# Patient Record
Sex: FEMALE | Race: WHITE | NOT HISPANIC OR LATINO
[De-identification: names, ages, dates, MRNs, and addresses within clinical notes are randomized per-mention and may not be internally consistent; named-entity substitution may affect disease eponyms.]

---

## 2017-02-10 ENCOUNTER — MEDICATION RENEWAL (OUTPATIENT)
Age: 67
End: 2017-02-10

## 2017-03-30 ENCOUNTER — APPOINTMENT (OUTPATIENT)
Dept: NEUROLOGY | Facility: CLINIC | Age: 67
End: 2017-03-30

## 2017-06-06 ENCOUNTER — APPOINTMENT (OUTPATIENT)
Dept: DERMATOLOGY | Facility: CLINIC | Age: 67
End: 2017-06-06

## 2017-08-25 ENCOUNTER — MEDICATION RENEWAL (OUTPATIENT)
Age: 67
End: 2017-08-25

## 2017-11-21 ENCOUNTER — RX RENEWAL (OUTPATIENT)
Age: 67
End: 2017-11-21

## 2017-12-14 ENCOUNTER — APPOINTMENT (OUTPATIENT)
Dept: MRI IMAGING | Facility: HOSPITAL | Age: 67
End: 2017-12-14

## 2017-12-19 ENCOUNTER — FORM ENCOUNTER (OUTPATIENT)
Age: 67
End: 2017-12-19

## 2017-12-20 ENCOUNTER — APPOINTMENT (OUTPATIENT)
Dept: MRI IMAGING | Facility: HOSPITAL | Age: 67
End: 2017-12-20

## 2017-12-20 ENCOUNTER — OUTPATIENT (OUTPATIENT)
Dept: OUTPATIENT SERVICES | Facility: HOSPITAL | Age: 67
LOS: 1 days | End: 2017-12-20
Payer: SUBSIDIZED

## 2017-12-20 DIAGNOSIS — Z00.6 ENCOUNTER FOR EXAMINATION FOR NORMAL COMPARISON AND CONTROL IN CLINICAL RESEARCH PROGRAM: ICD-10-CM

## 2017-12-20 PROCEDURE — 70551 MRI BRAIN STEM W/O DYE: CPT

## 2017-12-20 PROCEDURE — 70551 MRI BRAIN STEM W/O DYE: CPT | Mod: 26

## 2017-12-20 PROCEDURE — 78608 BRAIN IMAGING (PET): CPT

## 2018-01-02 ENCOUNTER — OTHER (OUTPATIENT)
Age: 68
End: 2018-01-02

## 2018-01-02 DIAGNOSIS — R29.6 REPEATED FALLS: ICD-10-CM

## 2018-03-19 ENCOUNTER — RX RENEWAL (OUTPATIENT)
Age: 68
End: 2018-03-19

## 2018-04-20 ENCOUNTER — APPOINTMENT (OUTPATIENT)
Dept: OPHTHALMOLOGY | Facility: CLINIC | Age: 68
End: 2018-04-20
Payer: MEDICARE

## 2018-04-20 DIAGNOSIS — Z83.518 FAMILY HISTORY OF OTHER SPECIFIED EYE DISORDER: ICD-10-CM

## 2018-04-20 PROCEDURE — 92083 EXTENDED VISUAL FIELD XM: CPT

## 2018-04-20 PROCEDURE — 99204 OFFICE O/P NEW MOD 45 MIN: CPT

## 2018-04-23 ENCOUNTER — FORM ENCOUNTER (OUTPATIENT)
Age: 68
End: 2018-04-23

## 2018-04-24 ENCOUNTER — APPOINTMENT (OUTPATIENT)
Dept: MRI IMAGING | Facility: HOSPITAL | Age: 68
End: 2018-04-24

## 2018-04-24 ENCOUNTER — OUTPATIENT (OUTPATIENT)
Dept: OUTPATIENT SERVICES | Facility: HOSPITAL | Age: 68
LOS: 1 days | End: 2018-04-24
Payer: SUBSIDIZED

## 2018-04-24 DIAGNOSIS — Z00.6 ENCOUNTER FOR EXAMINATION FOR NORMAL COMPARISON AND CONTROL IN CLINICAL RESEARCH PROGRAM: ICD-10-CM

## 2018-04-24 DIAGNOSIS — Z00.00 ENCOUNTER FOR GENERAL ADULT MEDICAL EXAMINATION WITHOUT ABNORMAL FINDINGS: ICD-10-CM

## 2018-04-24 PROCEDURE — 70551 MRI BRAIN STEM W/O DYE: CPT | Mod: 26

## 2018-04-24 PROCEDURE — 70551 MRI BRAIN STEM W/O DYE: CPT

## 2018-05-07 ENCOUNTER — RX RENEWAL (OUTPATIENT)
Age: 68
End: 2018-05-07

## 2018-05-09 ENCOUNTER — APPOINTMENT (OUTPATIENT)
Dept: MRI IMAGING | Facility: HOSPITAL | Age: 68
End: 2018-05-09

## 2018-05-21 ENCOUNTER — APPOINTMENT (OUTPATIENT)
Dept: OPHTHALMOLOGY | Facility: CLINIC | Age: 68
End: 2018-05-21
Payer: MEDICARE

## 2018-05-21 PROCEDURE — ZZZZZ: CPT

## 2018-05-21 PROCEDURE — 92012 INTRM OPH EXAM EST PATIENT: CPT

## 2018-05-21 PROCEDURE — 92083 EXTENDED VISUAL FIELD XM: CPT

## 2018-06-05 ENCOUNTER — OTHER (OUTPATIENT)
Age: 68
End: 2018-06-05

## 2018-06-11 DIAGNOSIS — H53.461 HOMONYMOUS BILATERAL FIELD DEFECTS, RIGHT SIDE: ICD-10-CM

## 2018-06-12 ENCOUNTER — OTHER (OUTPATIENT)
Age: 68
End: 2018-06-12

## 2018-06-18 ENCOUNTER — FORM ENCOUNTER (OUTPATIENT)
Age: 68
End: 2018-06-18

## 2018-06-19 ENCOUNTER — OUTPATIENT (OUTPATIENT)
Dept: OUTPATIENT SERVICES | Facility: HOSPITAL | Age: 68
LOS: 1 days | End: 2018-06-19
Payer: MEDICARE

## 2018-06-19 ENCOUNTER — APPOINTMENT (OUTPATIENT)
Dept: MRI IMAGING | Facility: HOSPITAL | Age: 68
End: 2018-06-19

## 2018-06-19 DIAGNOSIS — H53.461 HOMONYMOUS BILATERAL FIELD DEFECTS, RIGHT SIDE: ICD-10-CM

## 2018-06-19 DIAGNOSIS — G31.84 MILD COGNITIVE IMPAIRMENT OF UNCERTAIN OR UNKNOWN ETIOLOGY: ICD-10-CM

## 2018-06-19 DIAGNOSIS — G93.9 DISORDER OF BRAIN, UNSPECIFIED: ICD-10-CM

## 2018-06-19 PROCEDURE — A9585: CPT

## 2018-06-19 PROCEDURE — 70553 MRI BRAIN STEM W/O & W/DYE: CPT | Mod: 26

## 2018-06-19 PROCEDURE — 70553 MRI BRAIN STEM W/O & W/DYE: CPT

## 2018-07-17 ENCOUNTER — FORM ENCOUNTER (OUTPATIENT)
Age: 68
End: 2018-07-17

## 2018-07-18 ENCOUNTER — OUTPATIENT (OUTPATIENT)
Dept: OUTPATIENT SERVICES | Facility: HOSPITAL | Age: 68
LOS: 1 days | End: 2018-07-18
Payer: SUBSIDIZED

## 2018-07-18 ENCOUNTER — APPOINTMENT (OUTPATIENT)
Dept: MRI IMAGING | Facility: HOSPITAL | Age: 68
End: 2018-07-18

## 2018-07-18 DIAGNOSIS — Z00.6 ENCOUNTER FOR EXAMINATION FOR NORMAL COMPARISON AND CONTROL IN CLINICAL RESEARCH PROGRAM: ICD-10-CM

## 2018-07-18 DIAGNOSIS — Z00.00 ENCOUNTER FOR GENERAL ADULT MEDICAL EXAMINATION WITHOUT ABNORMAL FINDINGS: ICD-10-CM

## 2018-07-18 PROCEDURE — 70551 MRI BRAIN STEM W/O DYE: CPT | Mod: 26

## 2018-07-18 PROCEDURE — 70551 MRI BRAIN STEM W/O DYE: CPT

## 2018-09-17 ENCOUNTER — FORM ENCOUNTER (OUTPATIENT)
Age: 68
End: 2018-09-17

## 2018-09-18 ENCOUNTER — APPOINTMENT (OUTPATIENT)
Dept: MRI IMAGING | Facility: HOSPITAL | Age: 68
End: 2018-09-18

## 2018-09-18 ENCOUNTER — OUTPATIENT (OUTPATIENT)
Dept: OUTPATIENT SERVICES | Facility: HOSPITAL | Age: 68
LOS: 1 days | End: 2018-09-18
Payer: SUBSIDIZED

## 2018-09-18 DIAGNOSIS — Z00.6 ENCOUNTER FOR EXAMINATION FOR NORMAL COMPARISON AND CONTROL IN CLINICAL RESEARCH PROGRAM: ICD-10-CM

## 2018-09-18 DIAGNOSIS — I67.1 CEREBRAL ANEURYSM, NONRUPTURED: ICD-10-CM

## 2018-09-18 PROCEDURE — 70551 MRI BRAIN STEM W/O DYE: CPT

## 2018-09-18 PROCEDURE — 70551 MRI BRAIN STEM W/O DYE: CPT | Mod: 26

## 2018-11-27 ENCOUNTER — APPOINTMENT (OUTPATIENT)
Dept: OPHTHALMOLOGY | Facility: CLINIC | Age: 68
End: 2018-11-27
Payer: MEDICARE

## 2018-11-27 PROCEDURE — 92012 INTRM OPH EXAM EST PATIENT: CPT

## 2018-11-27 PROCEDURE — 92083 EXTENDED VISUAL FIELD XM: CPT

## 2018-11-27 PROCEDURE — 92015 DETERMINE REFRACTIVE STATE: CPT

## 2018-12-10 ENCOUNTER — RX RENEWAL (OUTPATIENT)
Age: 68
End: 2018-12-10

## 2018-12-15 ENCOUNTER — OTHER (OUTPATIENT)
Age: 68
End: 2018-12-15

## 2018-12-17 ENCOUNTER — FORM ENCOUNTER (OUTPATIENT)
Age: 68
End: 2018-12-17

## 2018-12-17 LAB — HBA1C MFR BLD HPLC: 5.8 %

## 2018-12-18 ENCOUNTER — OUTPATIENT (OUTPATIENT)
Dept: OUTPATIENT SERVICES | Facility: HOSPITAL | Age: 68
LOS: 1 days | End: 2018-12-18
Payer: SUBSIDIZED

## 2018-12-18 ENCOUNTER — APPOINTMENT (OUTPATIENT)
Dept: MRI IMAGING | Facility: HOSPITAL | Age: 68
End: 2018-12-18

## 2018-12-18 DIAGNOSIS — G93.9 DISORDER OF BRAIN, UNSPECIFIED: ICD-10-CM

## 2018-12-18 DIAGNOSIS — Z00.6 ENCOUNTER FOR EXAMINATION FOR NORMAL COMPARISON AND CONTROL IN CLINICAL RESEARCH PROGRAM: ICD-10-CM

## 2018-12-18 PROCEDURE — 70551 MRI BRAIN STEM W/O DYE: CPT | Mod: 26

## 2018-12-18 PROCEDURE — 70551 MRI BRAIN STEM W/O DYE: CPT

## 2019-04-11 ENCOUNTER — FORM ENCOUNTER (OUTPATIENT)
Age: 69
End: 2019-04-11

## 2019-04-12 ENCOUNTER — APPOINTMENT (OUTPATIENT)
Dept: MRI IMAGING | Facility: HOSPITAL | Age: 69
End: 2019-04-12

## 2019-04-12 ENCOUNTER — OUTPATIENT (OUTPATIENT)
Dept: OUTPATIENT SERVICES | Facility: HOSPITAL | Age: 69
LOS: 1 days | End: 2019-04-12
Payer: SUBSIDIZED

## 2019-04-12 DIAGNOSIS — Z00.6 ENCOUNTER FOR EXAMINATION FOR NORMAL COMPARISON AND CONTROL IN CLINICAL RESEARCH PROGRAM: ICD-10-CM

## 2019-04-12 DIAGNOSIS — I67.1 CEREBRAL ANEURYSM, NONRUPTURED: ICD-10-CM

## 2019-04-12 PROCEDURE — 70551 MRI BRAIN STEM W/O DYE: CPT

## 2019-04-12 PROCEDURE — 70551 MRI BRAIN STEM W/O DYE: CPT | Mod: 26

## 2019-04-24 ENCOUNTER — FORM ENCOUNTER (OUTPATIENT)
Age: 69
End: 2019-04-24

## 2019-04-25 ENCOUNTER — OUTPATIENT (OUTPATIENT)
Dept: OUTPATIENT SERVICES | Facility: HOSPITAL | Age: 69
LOS: 1 days | End: 2019-04-25
Payer: SUBSIDIZED

## 2019-04-25 DIAGNOSIS — G30.9 ALZHEIMER'S DISEASE, UNSPECIFIED: ICD-10-CM

## 2019-04-25 PROCEDURE — 78608 BRAIN IMAGING (PET): CPT

## 2019-05-17 ENCOUNTER — APPOINTMENT (OUTPATIENT)
Dept: OPHTHALMOLOGY | Facility: CLINIC | Age: 69
End: 2019-05-17
Payer: MEDICARE

## 2019-05-17 PROCEDURE — 92083 EXTENDED VISUAL FIELD XM: CPT

## 2019-05-17 PROCEDURE — 92012 INTRM OPH EXAM EST PATIENT: CPT

## 2019-10-02 ENCOUNTER — FORM ENCOUNTER (OUTPATIENT)
Age: 69
End: 2019-10-02

## 2019-10-03 ENCOUNTER — APPOINTMENT (OUTPATIENT)
Dept: MRI IMAGING | Facility: HOSPITAL | Age: 69
End: 2019-10-03

## 2019-10-03 ENCOUNTER — OUTPATIENT (OUTPATIENT)
Dept: OUTPATIENT SERVICES | Facility: HOSPITAL | Age: 69
LOS: 1 days | End: 2019-10-03
Payer: SUBSIDIZED

## 2019-10-03 DIAGNOSIS — I67.1 CEREBRAL ANEURYSM, NONRUPTURED: ICD-10-CM

## 2019-10-03 DIAGNOSIS — Z00.6 ENCOUNTER FOR EXAMINATION FOR NORMAL COMPARISON AND CONTROL IN CLINICAL RESEARCH PROGRAM: ICD-10-CM

## 2019-10-03 PROCEDURE — 70551 MRI BRAIN STEM W/O DYE: CPT

## 2019-10-03 PROCEDURE — 70551 MRI BRAIN STEM W/O DYE: CPT | Mod: 26

## 2019-11-04 ENCOUNTER — RX RENEWAL (OUTPATIENT)
Age: 69
End: 2019-11-04

## 2020-04-25 ENCOUNTER — NON-APPOINTMENT (OUTPATIENT)
Age: 70
End: 2020-04-25

## 2020-04-28 ENCOUNTER — APPOINTMENT (OUTPATIENT)
Dept: GASTROENTEROLOGY | Facility: CLINIC | Age: 70
End: 2020-04-28
Payer: MEDICARE

## 2020-04-28 VITALS — WEIGHT: 140 LBS | BODY MASS INDEX: 26.43 KG/M2 | HEIGHT: 61 IN

## 2020-04-28 DIAGNOSIS — R74.8 ABNORMAL LEVELS OF OTHER SERUM ENZYMES: ICD-10-CM

## 2020-04-28 DIAGNOSIS — E66.3 OVERWEIGHT: ICD-10-CM

## 2020-04-28 DIAGNOSIS — K80.20 CALCULUS OF GALLBLADDER W/OUT CHOLECYSTITIS W/OUT OBSTRUCTION: ICD-10-CM

## 2020-04-28 PROCEDURE — 99204 OFFICE O/P NEW MOD 45 MIN: CPT | Mod: 95

## 2020-04-28 NOTE — HISTORY OF PRESENT ILLNESS
[FreeTextEntry1] : 3:20 PM 4/28/2020.  Liver chemistries were noted to be quite elevated on 4/17, somewhat improved on 4/24 (/246, /96, /217, Bili 1.3/0.7).  Abdominal ultrasound 4/24 revealed mildly dilated CBD (8-mm) and stone filled contracted gallbladder.  MRCP 4/27 revealed 5-mm stone in the distal intrapancreatic segment of the bile duct, with gallbladder packed with stones, and scattered renal cysts.  In retrospect, Angeline had a "bad stomach ache" (periumbilical) approximately two weeks ago that lasted several hours, and then lesser symptoms a few days after that, the more recent bout occurring approximately 4-5 days ago.  She denied associated fever, nausea, chills, jaundice, dark urine, light stool, or burping.  Her mother had gallbladder abscess, and her daughter has required multiple ERCPs for ampullary stenosis/biliary dyskinesia.

## 2020-04-28 NOTE — CONSULT LETTER
[Dear  ___] : Dear  [unfilled], [Consult Letter:] : I had the pleasure of evaluating your patient, [unfilled]. [Please see my note below.] : Please see my note below. [Consult Closing:] : Thank you very much for allowing me to participate in the care of this patient.  If you have any questions, please do not hesitate to contact me. [Sincerely,] : Sincerely, [DrCaden ___] : Dr. GIFFORD [DrCaden  ___] : Dr. GIFFORD [FreeTextEntry3] : Ld Mclaughlin M.D.\par

## 2020-04-28 NOTE — ASSESSMENT
[FreeTextEntry1] : 1.  Recurrent biliary colic, with small common bile duct stone and many gallstones, with improving liver chemistries--at risk for pancreatitis, cholangitis, cholecystitis.\par 2.  History of colonic polyps, last colonoscopy May 2013.\par 3.  Hypertension.\par 4.  Hypercholesterolemia.\par 5.  Overweight.\par 6.  Mild cognitive impairment with memory loss, on research protocol.\par 7.  Allergic to penicillin.\par \par Suggest:\par 1.  Medical records reviewed.\par 2.  Low-fat diet for now.\par 3.  Refer for consideration of ERCP (Dr. Bob Elizondo) and probable laparoscopic cholecystectomy to follow. Procedure, rationale, alternatives, material risks, and anesthesia plan were reviewed and ERCP brochure will be mailed.  Kj and Angeline will want to discuss timing of procedures, COVID19 concerns with Dr. Elizondo before proceeding. Dr. Elizondo will refer to the surgeon.

## 2020-04-28 NOTE — PHYSICAL EXAM
[General Appearance - Alert] : alert [General Appearance - In No Acute Distress] : in no acute distress [General Appearance - Well Nourished] : well nourished [General Appearance - Well Developed] : well developed [FreeTextEntry1] : mildly overweight

## 2020-04-28 NOTE — REVIEW OF SYSTEMS
[As Noted in HPI] : as noted in HPI [Abdominal Pain] : abdominal pain [FreeTextEntry7] : abdominal discomfort

## 2020-04-28 NOTE — REASON FOR VISIT
[Medical Office: ([facility])___] : at the medical office located in  [Home] : at home, [unfilled] , at the time of the visit. [Patient] : the patient [Spouse] : spouse [Self] : self [Consultation] : a consultation visit [FreeTextEntry2] : Spouse Kj is in on call [FreeTextEntry4] : Rani Monique [FreeTextEntry1] : elevated liver tests, stones

## 2020-04-29 VITALS
DIASTOLIC BLOOD PRESSURE: 80 MMHG | RESPIRATION RATE: 16 BRPM | HEIGHT: 61 IN | WEIGHT: 140 LBS | SYSTOLIC BLOOD PRESSURE: 140 MMHG | BODY MASS INDEX: 26.43 KG/M2 | HEART RATE: 65 BPM

## 2020-05-04 ENCOUNTER — APPOINTMENT (OUTPATIENT)
Dept: GASTROENTEROLOGY | Facility: CLINIC | Age: 70
End: 2020-05-04
Payer: MEDICARE

## 2020-05-04 DIAGNOSIS — K80.50 CALCULUS OF BILE DUCT W/OUT CHOLANGITIS OR CHOLECYSTITIS W/OUT OBSTRUCTION: ICD-10-CM

## 2020-05-04 PROCEDURE — 99213 OFFICE O/P EST LOW 20 MIN: CPT | Mod: 95

## 2020-05-04 PROCEDURE — 99203 OFFICE O/P NEW LOW 30 MIN: CPT | Mod: 95

## 2020-05-04 NOTE — HISTORY OF PRESENT ILLNESS
[Verbal consent obtained from patient] : the patient, [unfilled] [Time Spent: ___ minutes] : I have spent [unfilled] minutes with the patient on the telephone [FreeTextEntry1] : Used [employer]\par \par 69 year old with early memory issues.  initiated to discuss ERCP. A CBD stone was found on imaging for w/u of biliary colic. \par \par No heart problems\par Just a benign mumor \par \par No lung problems. No SOB with walking many problems\par \par Has had anesthesia in the past (5 years ago) and tolerated it well. \par \par No blood thinners. \par \par  [FreeTextEntry3] :

## 2020-05-04 NOTE — HISTORY OF PRESENT ILLNESS
[Verbal consent obtained from patient] : the patient, [unfilled] [Time Spent: ___ minutes] : I have spent [unfilled] minutes with the patient on the telephone [FreeTextEntry1] : Used [employer]\par \par 69 year old with early memory issues.  initiated to discuss ERCP. A CBD stone was found on imaging for w/u of biliary colic. \par \par No heart problems\par Just a benign mumor \par \par No lung problems. No SOB with walking many problems\par \par Has had anesthesia in the past (5 years ago) and tolerated it well. \par \par No blood thinners. \par \par  [FreeTextEntry3] :

## 2020-05-04 NOTE — PLAN
[FreeTextEntry1] : I discussed the proposed  ERCP with the patient and family.  I discussed the risks (bleeding, infection, perforation, pancreatitis, and anesthesia risks), benefits, and alternatives  with the patient. The patient agrees to proceed. \par \par Thank you for involving me in their care.\par \par Bob Elizondo MD\par Director of Endoscopy\par [facility]\par [facility]\par Phone: [phone]\par Fax [phone]\par

## 2020-05-04 NOTE — PLAN
[FreeTextEntry1] : I discussed the proposed  ERCP with the patient and family.  I discussed the risks (bleeding, infection, perforation, pancreatitis, and anesthesia risks), benefits, and alternatives  with the patient. The patient agrees to proceed. \par \par Thank you for involving me in their care.\par \par Bob Elizondo MD\par Director of Endoscopy\par [facility]\par [facility]\par Phone: [phone]\par Fax [phone]\par

## 2020-05-18 ENCOUNTER — TRANSCRIPTION ENCOUNTER (OUTPATIENT)
Age: 70
End: 2020-05-18

## 2020-05-19 DIAGNOSIS — Z01.818 ENCOUNTER FOR OTHER PREPROCEDURAL EXAMINATION: ICD-10-CM

## 2020-05-20 ENCOUNTER — APPOINTMENT (OUTPATIENT)
Dept: DISASTER EMERGENCY | Facility: CLINIC | Age: 70
End: 2020-05-20

## 2020-05-21 LAB — SARS-COV-2 N GENE NPH QL NAA+PROBE: NOT DETECTED

## 2020-05-22 ENCOUNTER — APPOINTMENT (OUTPATIENT)
Dept: GASTROENTEROLOGY | Facility: HOSPITAL | Age: 70
End: 2020-05-22

## 2020-05-22 ENCOUNTER — OUTPATIENT (OUTPATIENT)
Dept: OUTPATIENT SERVICES | Facility: HOSPITAL | Age: 70
LOS: 1 days | Discharge: HOME | End: 2020-05-22
Payer: MEDICARE

## 2020-05-22 VITALS
DIASTOLIC BLOOD PRESSURE: 78 MMHG | SYSTOLIC BLOOD PRESSURE: 137 MMHG | HEIGHT: 61 IN | TEMPERATURE: 99 F | RESPIRATION RATE: 16 BRPM | OXYGEN SATURATION: 99 % | HEART RATE: 78 BPM | WEIGHT: 139.99 LBS

## 2020-05-22 VITALS
DIASTOLIC BLOOD PRESSURE: 74 MMHG | OXYGEN SATURATION: 97 % | SYSTOLIC BLOOD PRESSURE: 148 MMHG | HEART RATE: 88 BPM | RESPIRATION RATE: 15 BRPM

## 2020-05-22 DIAGNOSIS — K80.50 CALCULUS OF BILE DUCT WITHOUT CHOLANGITIS OR CHOLECYSTITIS WITHOUT OBSTRUCTION: ICD-10-CM

## 2020-05-22 PROCEDURE — 43262 ENDO CHOLANGIOPANCREATOGRAPH: CPT | Mod: GC

## 2020-05-22 PROCEDURE — 74328 X-RAY BILE DUCT ENDOSCOPY: CPT | Mod: 26,GC

## 2020-05-22 PROCEDURE — 43264 ERCP REMOVE DUCT CALCULI: CPT | Mod: GC

## 2020-05-22 RX ORDER — INDOMETHACIN 50 MG
100 CAPSULE ORAL ONCE
Refills: 0 | Status: COMPLETED | OUTPATIENT
Start: 2020-05-22 | End: 2020-05-22

## 2020-05-22 RX ORDER — SODIUM CHLORIDE 9 MG/ML
2000 INJECTION, SOLUTION INTRAVENOUS ONCE
Refills: 0 | Status: COMPLETED | OUTPATIENT
Start: 2020-05-22 | End: 2020-05-22

## 2020-05-22 RX ADMIN — SODIUM CHLORIDE 666.67 MILLILITER(S): 9 INJECTION, SOLUTION INTRAVENOUS at 11:32

## 2020-05-22 RX ADMIN — Medication 100 MILLIGRAM(S): at 09:03

## 2020-05-28 PROBLEM — R41.3 OTHER AMNESIA: Chronic | Status: ACTIVE | Noted: 2020-05-22

## 2020-05-28 PROBLEM — R32 UNSPECIFIED URINARY INCONTINENCE: Chronic | Status: ACTIVE | Noted: 2020-05-22

## 2020-05-28 PROBLEM — R26.89 OTHER ABNORMALITIES OF GAIT AND MOBILITY: Chronic | Status: ACTIVE | Noted: 2020-05-22

## 2020-05-28 PROBLEM — I10 ESSENTIAL (PRIMARY) HYPERTENSION: Chronic | Status: ACTIVE | Noted: 2020-05-22

## 2020-06-07 ENCOUNTER — RESULT REVIEW (OUTPATIENT)
Age: 70
End: 2020-06-07

## 2020-06-07 ENCOUNTER — INPATIENT (INPATIENT)
Facility: HOSPITAL | Age: 70
LOS: 2 days | Discharge: HOME | DRG: 419 | End: 2020-06-10
Attending: SURGERY | Admitting: HOSPITALIST
Payer: MEDICARE

## 2020-06-07 VITALS
OXYGEN SATURATION: 97 % | HEIGHT: 61 IN | HEART RATE: 72 BPM | DIASTOLIC BLOOD PRESSURE: 90 MMHG | WEIGHT: 139.99 LBS | TEMPERATURE: 98 F | RESPIRATION RATE: 18 BRPM | SYSTOLIC BLOOD PRESSURE: 151 MMHG

## 2020-06-07 DIAGNOSIS — K80.50 CALCULUS OF BILE DUCT WITHOUT CHOLANGITIS OR CHOLECYSTITIS WITHOUT OBSTRUCTION: ICD-10-CM

## 2020-06-07 DIAGNOSIS — Z02.9 ENCOUNTER FOR ADMINISTRATIVE EXAMINATIONS, UNSPECIFIED: ICD-10-CM

## 2020-06-07 DIAGNOSIS — I10 ESSENTIAL (PRIMARY) HYPERTENSION: ICD-10-CM

## 2020-06-07 DIAGNOSIS — Z29.9 ENCOUNTER FOR PROPHYLACTIC MEASURES, UNSPECIFIED: ICD-10-CM

## 2020-06-07 DIAGNOSIS — R41.3 OTHER AMNESIA: ICD-10-CM

## 2020-06-07 LAB
ALBUMIN SERPL ELPH-MCNC: 4.6 G/DL — SIGNIFICANT CHANGE UP (ref 3.3–5)
ALP SERPL-CCNC: 85 U/L — SIGNIFICANT CHANGE UP (ref 40–120)
ALT FLD-CCNC: 11 U/L — SIGNIFICANT CHANGE UP (ref 10–45)
ANION GAP SERPL CALC-SCNC: 12 MMOL/L — SIGNIFICANT CHANGE UP (ref 5–17)
APPEARANCE UR: CLEAR — SIGNIFICANT CHANGE UP
AST SERPL-CCNC: 12 U/L — SIGNIFICANT CHANGE UP (ref 10–40)
BACTERIA # UR AUTO: NEGATIVE — SIGNIFICANT CHANGE UP
BASE EXCESS BLDV CALC-SCNC: 2.9 MMOL/L — HIGH (ref -2–2)
BASOPHILS # BLD AUTO: 0.05 K/UL — SIGNIFICANT CHANGE UP (ref 0–0.2)
BASOPHILS NFR BLD AUTO: 0.5 % — SIGNIFICANT CHANGE UP (ref 0–2)
BILIRUB SERPL-MCNC: 0.2 MG/DL — SIGNIFICANT CHANGE UP (ref 0.2–1.2)
BILIRUB UR-MCNC: NEGATIVE — SIGNIFICANT CHANGE UP
BUN SERPL-MCNC: 29 MG/DL — HIGH (ref 7–23)
CA-I SERPL-SCNC: 1.18 MMOL/L — SIGNIFICANT CHANGE UP (ref 1.12–1.3)
CALCIUM SERPL-MCNC: 9.4 MG/DL — SIGNIFICANT CHANGE UP (ref 8.4–10.5)
CHLORIDE BLDV-SCNC: 105 MMOL/L — SIGNIFICANT CHANGE UP (ref 96–108)
CHLORIDE SERPL-SCNC: 101 MMOL/L — SIGNIFICANT CHANGE UP (ref 96–108)
CO2 BLDV-SCNC: 30 MMOL/L — SIGNIFICANT CHANGE UP (ref 22–30)
CO2 SERPL-SCNC: 26 MMOL/L — SIGNIFICANT CHANGE UP (ref 22–31)
COLOR SPEC: COLORLESS — SIGNIFICANT CHANGE UP
CREAT SERPL-MCNC: 0.86 MG/DL — SIGNIFICANT CHANGE UP (ref 0.5–1.3)
DIFF PNL FLD: NEGATIVE — SIGNIFICANT CHANGE UP
EOSINOPHIL # BLD AUTO: 0 K/UL — SIGNIFICANT CHANGE UP (ref 0–0.5)
EOSINOPHIL NFR BLD AUTO: 0 % — SIGNIFICANT CHANGE UP (ref 0–6)
EPI CELLS # UR: 3 /HPF — SIGNIFICANT CHANGE UP
GAS PNL BLDV: 138 MMOL/L — SIGNIFICANT CHANGE UP (ref 135–145)
GAS PNL BLDV: SIGNIFICANT CHANGE UP
GLUCOSE BLDV-MCNC: 201 MG/DL — HIGH (ref 70–99)
GLUCOSE SERPL-MCNC: 210 MG/DL — HIGH (ref 70–99)
GLUCOSE UR QL: NEGATIVE — SIGNIFICANT CHANGE UP
HCO3 BLDV-SCNC: 29 MMOL/L — SIGNIFICANT CHANGE UP (ref 21–29)
HCT VFR BLD CALC: 44.3 % — SIGNIFICANT CHANGE UP (ref 34.5–45)
HCT VFR BLDA CALC: 45 % — SIGNIFICANT CHANGE UP (ref 39–50)
HGB BLD CALC-MCNC: 14.7 G/DL — SIGNIFICANT CHANGE UP (ref 11.5–15.5)
HGB BLD-MCNC: 14.1 G/DL — SIGNIFICANT CHANGE UP (ref 11.5–15.5)
HYALINE CASTS # UR AUTO: 0 /LPF — SIGNIFICANT CHANGE UP (ref 0–2)
IMM GRANULOCYTES NFR BLD AUTO: 0.3 % — SIGNIFICANT CHANGE UP (ref 0–1.5)
KETONES UR-MCNC: NEGATIVE — SIGNIFICANT CHANGE UP
LACTATE BLDV-MCNC: 1.6 MMOL/L — SIGNIFICANT CHANGE UP (ref 0.7–2)
LEUKOCYTE ESTERASE UR-ACNC: NEGATIVE — SIGNIFICANT CHANGE UP
LIDOCAIN IGE QN: 30 U/L — SIGNIFICANT CHANGE UP (ref 7–60)
LYMPHOCYTES # BLD AUTO: 1.33 K/UL — SIGNIFICANT CHANGE UP (ref 1–3.3)
LYMPHOCYTES # BLD AUTO: 13.9 % — SIGNIFICANT CHANGE UP (ref 13–44)
MCHC RBC-ENTMCNC: 30.1 PG — SIGNIFICANT CHANGE UP (ref 27–34)
MCHC RBC-ENTMCNC: 31.8 GM/DL — LOW (ref 32–36)
MCV RBC AUTO: 94.7 FL — SIGNIFICANT CHANGE UP (ref 80–100)
MONOCYTES # BLD AUTO: 0.44 K/UL — SIGNIFICANT CHANGE UP (ref 0–0.9)
MONOCYTES NFR BLD AUTO: 4.6 % — SIGNIFICANT CHANGE UP (ref 2–14)
NEUTROPHILS # BLD AUTO: 7.75 K/UL — HIGH (ref 1.8–7.4)
NEUTROPHILS NFR BLD AUTO: 80.7 % — HIGH (ref 43–77)
NITRITE UR-MCNC: NEGATIVE — SIGNIFICANT CHANGE UP
NRBC # BLD: 0 /100 WBCS — SIGNIFICANT CHANGE UP (ref 0–0)
OTHER CELLS CSF MANUAL: 8 ML/DL — LOW (ref 18–22)
PCO2 BLDV: 51 MMHG — HIGH (ref 35–50)
PH BLDV: 7.37 — SIGNIFICANT CHANGE UP (ref 7.35–7.45)
PH UR: 7.5 — SIGNIFICANT CHANGE UP (ref 5–8)
PLATELET # BLD AUTO: 308 K/UL — SIGNIFICANT CHANGE UP (ref 150–400)
PO2 BLDV: 25 MMHG — SIGNIFICANT CHANGE UP (ref 25–45)
POTASSIUM BLDV-SCNC: 3.6 MMOL/L — SIGNIFICANT CHANGE UP (ref 3.5–5.3)
POTASSIUM SERPL-MCNC: 3.8 MMOL/L — SIGNIFICANT CHANGE UP (ref 3.5–5.3)
POTASSIUM SERPL-SCNC: 3.8 MMOL/L — SIGNIFICANT CHANGE UP (ref 3.5–5.3)
PROT SERPL-MCNC: 6.7 G/DL — SIGNIFICANT CHANGE UP (ref 6–8.3)
PROT UR-MCNC: NEGATIVE — SIGNIFICANT CHANGE UP
RBC # BLD: 4.68 M/UL — SIGNIFICANT CHANGE UP (ref 3.8–5.2)
RBC # FLD: 14 % — SIGNIFICANT CHANGE UP (ref 10.3–14.5)
RBC CASTS # UR COMP ASSIST: 1 /HPF — SIGNIFICANT CHANGE UP (ref 0–4)
SAO2 % BLDV: 39 % — LOW (ref 67–88)
SARS-COV-2 RNA SPEC QL NAA+PROBE: SIGNIFICANT CHANGE UP
SODIUM SERPL-SCNC: 139 MMOL/L — SIGNIFICANT CHANGE UP (ref 135–145)
SP GR SPEC: 1.04 — HIGH (ref 1.01–1.02)
UROBILINOGEN FLD QL: NEGATIVE — SIGNIFICANT CHANGE UP
WBC # BLD: 9.6 K/UL — SIGNIFICANT CHANGE UP (ref 3.8–10.5)
WBC # FLD AUTO: 9.6 K/UL — SIGNIFICANT CHANGE UP (ref 3.8–10.5)
WBC UR QL: 2 /HPF — SIGNIFICANT CHANGE UP (ref 0–5)

## 2020-06-07 PROCEDURE — 76705 ECHO EXAM OF ABDOMEN: CPT | Mod: 26,RT

## 2020-06-07 PROCEDURE — 71045 X-RAY EXAM CHEST 1 VIEW: CPT | Mod: 26

## 2020-06-07 PROCEDURE — 74177 CT ABD & PELVIS W/CONTRAST: CPT | Mod: 26

## 2020-06-07 PROCEDURE — 99222 1ST HOSP IP/OBS MODERATE 55: CPT | Mod: GC,57

## 2020-06-07 PROCEDURE — 99223 1ST HOSP IP/OBS HIGH 75: CPT | Mod: GC

## 2020-06-07 PROCEDURE — 99285 EMERGENCY DEPT VISIT HI MDM: CPT | Mod: GC

## 2020-06-07 RX ORDER — ESCITALOPRAM OXALATE 10 MG/1
10 TABLET, FILM COATED ORAL DAILY
Refills: 0 | Status: DISCONTINUED | OUTPATIENT
Start: 2020-06-07 | End: 2020-06-10

## 2020-06-07 RX ORDER — OXYBUTYNIN CHLORIDE 5 MG
5 TABLET ORAL
Refills: 0 | Status: DISCONTINUED | OUTPATIENT
Start: 2020-06-07 | End: 2020-06-10

## 2020-06-07 RX ORDER — ACETAMINOPHEN 500 MG
650 TABLET ORAL EVERY 6 HOURS
Refills: 0 | Status: DISCONTINUED | OUTPATIENT
Start: 2020-06-07 | End: 2020-06-08

## 2020-06-07 RX ORDER — DONEPEZIL HYDROCHLORIDE 10 MG/1
10 TABLET, FILM COATED ORAL AT BEDTIME
Refills: 0 | Status: DISCONTINUED | OUTPATIENT
Start: 2020-06-07 | End: 2020-06-10

## 2020-06-07 RX ORDER — SODIUM CHLORIDE 9 MG/ML
1000 INJECTION INTRAMUSCULAR; INTRAVENOUS; SUBCUTANEOUS
Refills: 0 | Status: DISCONTINUED | OUTPATIENT
Start: 2020-06-07 | End: 2020-06-08

## 2020-06-07 RX ORDER — SODIUM CHLORIDE 9 MG/ML
1000 INJECTION INTRAMUSCULAR; INTRAVENOUS; SUBCUTANEOUS ONCE
Refills: 0 | Status: COMPLETED | OUTPATIENT
Start: 2020-06-07 | End: 2020-06-07

## 2020-06-07 RX ORDER — DILTIAZEM HCL 120 MG
90 CAPSULE, EXT RELEASE 24 HR ORAL DAILY
Refills: 0 | Status: DISCONTINUED | OUTPATIENT
Start: 2020-06-07 | End: 2020-06-10

## 2020-06-07 RX ADMIN — DONEPEZIL HYDROCHLORIDE 10 MILLIGRAM(S): 10 TABLET, FILM COATED ORAL at 21:33

## 2020-06-07 RX ADMIN — Medication 90 MILLIGRAM(S): at 21:33

## 2020-06-07 RX ADMIN — Medication 5 MILLIGRAM(S): at 19:59

## 2020-06-07 RX ADMIN — SODIUM CHLORIDE 1000 MILLILITER(S): 9 INJECTION INTRAMUSCULAR; INTRAVENOUS; SUBCUTANEOUS at 06:56

## 2020-06-07 RX ADMIN — ESCITALOPRAM OXALATE 10 MILLIGRAM(S): 10 TABLET, FILM COATED ORAL at 21:33

## 2020-06-07 RX ADMIN — SODIUM CHLORIDE 75 MILLILITER(S): 9 INJECTION INTRAMUSCULAR; INTRAVENOUS; SUBCUTANEOUS at 18:35

## 2020-06-07 NOTE — CONSULT NOTE ADULT - SUBJECTIVE AND OBJECTIVE BOX
GENERAL SURGERY CONSULT NOTE  Attending: Held  Service: Red surgery  Contact: p [phone]    HPI  68 yo woman w/ mild dementia/imbalance, cholelithiasis, recent choledocholithiasis (s/p 5 mm stone removal via ERCP 2020 by Dr. Elizondo), presents with acute epigastric abdominal pain associated with nausea (no emesis). Pain began this morning,  was sharp in nature, rate 8/10, radiation to the back, unrelieved by Advil, duration ~2 hr, resolved spontaneously. Pt was noted with transaminitis on routine lab work in 2020 (, , , Bili 1.3. MRCP  revealed 8 mm CBD, 5-mm stone in the distal intrapancreatic bile duct, and a stone filled gallbladder. Pt s/p stone removal via ERCP 2020. Was planning for outpatient cholecystectomy with Dr. Ambrose on 6/15.Pt denies fevers, chills, chest pain, SOB, cough, dysuria, recent travel or sick/COVID contacts. No history of surgery.    In the ER the patient was afebrile, HR 80, /80, SpO2 99% RA. On exam she was mildly tender to deep palpation in the RUQ without rebound or guarding. CTAP revealed cholelithiasis with an 8mm CBD, mild intrahepatic bile duct dilation.     PMH/PSH  Memory changes  Balance disorder  Bladder incontinence  HTN (hypertension)    No significant past surgical history      MEDICATIONS  acetaminophen   Tablet .. 650 milliGRAM(s) Oral every 6 hours PRN  diltiazem    Tablet 90 milliGRAM(s) Oral daily  donepezil 10 milliGRAM(s) Oral at bedtime  escitalopram 10 milliGRAM(s) Oral daily  oxybutynin 5 milliGRAM(s) Oral two times a day      Allergies    penicillin (Rash)    Intolerances        Social: mild dementia, no ETOH, non smoker, lives with .     Physical Exam  T(C): 36.7 (20 @ 10:46), Max: 36.9 (20 @ 06:18)  HR: 66 (20 @ 10:46) (66 - 80)  BP: 144/92 (20 @ 10:46) (144/92 - 152/80)  RR: 18 (20 @ 10:46) (16 - 18)  SpO2: 99% (20 @ 10:46) (97% - 99%)  Wt(kg): --  Tmax: T(C): , Max: 36.9 (20 @ 06:18)  Wt(kg): --      Gen: NAD  Neuro: AAOx3  HEENT: normocephalic, atraumatic, no scleral icterus  CV: S1, S2, RRR  Pulm: CTA B/L  Abd: Soft, ND, mild tenderness in RUQ, no rebound, no guarding, no palpable organomegaly/masses  Ext: warm, no edema    LABS                        14.1   9.60  )-----------( 308      ( 2020 07:06 )             44.3     06-07    139  |  101  |  29<H>  ----------------------------<  210<H>  3.8   |  26  |  0.86    Ca    9.4      2020 07:06    TPro  6.7  /  Alb  4.6  /  TBili  0.2  /  DBili  x   /  AST  12  /  ALT  11  /  AlkPhos  85  06-07      Urinalysis Basic - ( 2020 09:57 )    Color: Colorless / Appearance: Clear / S.038 / pH: x  Gluc: x / Ketone: Negative  / Bili: Negative / Urobili: Negative   Blood: x / Protein: Negative / Nitrite: Negative   Leuk Esterase: Negative / RBC: 1 /hpf / WBC 2 /HPF   Sq Epi: x / Non Sq Epi: 3 /hpf / Bacteria: Negative            IMAGING  < from: CT Abdomen and Pelvis w/ IV Cont (20 @ 08:39) >  PROCEDURE:       FINDINGS:  LOWER CHEST: Within normal limits.    LIVER: Within normal limits.  BILE DUCTS: Common bile duct measures 8 mm. Mild central intrahepatic biliary ductal dilatation.  GALLBLADDER: Cholelithiasis.  SPLEEN: Within normal limits.  PANCREAS: Within normal limits.  ADRENALS: Within normal limits.  KIDNEYS/URETERS: Bilateral subcentimeter hypodensities too small to characterize, however are likely cysts. No hydronephrosis. Kidneys enhance symmetrically.    BLADDER: Within normal limits.  REPRODUCTIVE ORGANS: Uterus and adnexa within normal limits.    BOWEL: No bowel obstruction. Colonic diverticulosis without evidence of acute diverticulitis. Appendix is not visualized, however, there is no right lower quadrant inflammation.  PERITONEUM: No ascites.  VESSELS: The aorta is normal in caliber. Major branches are approximately patent. Aortic calcifications. The portal vein and hepatic veins are patent.  RETROPERITONEUM/LYMPH NODES: No lymphadenopathy.    ABDOMINAL WALL: Tiny fat-containing umbilical hernia.  BONES: Scoliosis. Mild degenerative changes.    IMPRESSION:   1.  Cholelithiasis.  2.  Common bile duct measures 8 mm.     < end of copied text >

## 2020-06-07 NOTE — H&P ADULT - NSHPREVIEWOFSYSTEMS_GEN_ALL_CORE
CONSTITUTIONAL: No weakness, fevers or chills  EYES: No blindness, no eye pain   ENT:  No throat pain, No rhinorrhea   NECK: No pain or stiffness  RESPIRATORY: No cough, wheezing, hemoptysis; No shortness of breath  CARDIOVASCULAR: No chest pain or palpitations  GASTROINTESTINAL: +RUQ abdominal, +nausea, vomiting, or diarrhea   GENITOURINARY: No dysuria, change in frequency or hematuria  NEUROLOGICAL: No numbness or weakness  SKIN: No itching, burning, rashes, or lesions   PSYCH: No depression or anxiety  All other review of systems is negative unless indicated above.

## 2020-06-07 NOTE — H&P ADULT - ATTENDING COMMENTS
Patient seen and examined. Plan as discussed w/ Dr. Hellerman is to continue home Rx, trend transaminases, consult GI for ?MRCP/ERCP as required, and surgery for lap merecd prior to discharge as was already planned for as outpatient. Monitor pain and provide analgesia as required; diet as tolerated once surgical plans clarified.

## 2020-06-07 NOTE — ED PROVIDER NOTE - OBJECTIVE STATEMENT
69 F w/ PMH of HTN, memory and balance problems, presents to the ED w/ stomach pain, no vomiting, no nausea, no shortness of breath, no fevers no chills, no pain w/ urination. Normal appetite. history provided by pts  and patient.   woke up at 430 AM w/ pain L sided abdominal discomfort per  pain was initially in the back and then stomach additionally pt scheduled for GB removal w/ Dr. Ambrose s/p ERCP

## 2020-06-07 NOTE — H&P ADULT - PROBLEM SELECTOR PLAN 1
CT abd 6/7: CBD 8mm.  Transaminases, bilirubin and lipase wnl  GI c/s for ERCP  Surgery c/s for cholecystectomy  Pain control with S/p ERCP with 5mm stone removal 5/22/2020   CT abd 6/7/2020: CBD 8mm.  Transaminases, bilirubin and lipase wnl  GI c/s for consideration of MRCP vs EUS vs ERCP. Given low risk status may be candidate for cholecystectomy with intraoperative imaging.  Pt currently asymptomatic, PRN Tylenol for pain management. S/p ERCP with 5mm stone removal 5/22/2020   CT abd 6/7/2020: CBD 8mm. Of note CBD dilitaiton does not resolved for >3mo in 30% of individuals.   Transaminases, bilirubin and lipase wnl  GI c/s for consideration of MRCP vs EUS vs ERCP. Given low risk status may be candidate for cholecystectomy with intraoperative imaging.  Pt currently asymptomatic, PRN Tylenol for pain management. S/p ERCP with 5mm stone removal 5/22/2020   CT abd 6/7/2020: CBD 8mm. Of note CBD dilitaiton does not resolved for >3mo in 30% of individuals.   Transaminases, bilirubin and lipase wnl  GI consulted for consideration of MRCP vs EUS vs ERCP. Given low risk status may be candidate for cholecystectomy with intraoperative imaging.  Surgery consulted for consideration of expedited cholecystectomy   Pt currently asymptomatic, PRN Tylenol for pain management. S/p ERCP with 5mm stone removal 5/22/2020   CT abd 6/7/2020: CBD 8mm. Of note CBD dilitaiton does not resolved for >3mo in 30% of individuals.   Transaminases, bilirubin and lipase wnl  GI consulted for consideration of MRCP vs EUS vs ERCP. Given low risk status may be candidate for cholecystectomy with intraoperative imaging.  Surgery consulted for consideration of expedited cholecystectomy. Was initially scheduled to have outpt cholecystectomy with Dr. Ambrose on 6/15  Pt currently asymptomatic, PRN Tylenol for pain management.

## 2020-06-07 NOTE — H&P ADULT - PROBLEM SELECTOR PLAN 5
Transitions of Care Status:  1.  Name of PCP:  2.  PCP Contacted on Admission: [ ] Y    [ ] N    3.  PCP contacted at Discharge: [ ] Y    [ ] N    [ ] N/A  4.  Post-Discharge Appointment Date and Location:  5.  Summary of Handoff given to PCP: Transitions of Care Status:  1.  Name of PCP: Dr. Kj Ramirez   2.  PCP Contacted on Admission: [ ] Y    [ ] N    3.  PCP contacted at Discharge: [ ] Y    [ ] N    [ ] N/A  4.  Post-Discharge Appointment Date and Location:  5.  Summary of Handoff given to PCP:

## 2020-06-07 NOTE — CONSULT NOTE ADULT - SUBJECTIVE AND OBJECTIVE BOX
Chief Complaint: Abdominal pain    HPI:    70 y/o F w/ hx of HTN, dementia, cholelithiasis, and choledocholithiasis s/p ERCP on 20 with biliary sphincterotomy and balloon sweeps with removal of bile duct stone, presenting with abdominal pain, found to have dilated CBD on CT A/P.    The patient developed acute onset of abdominal pain, which she described as sharp, located in her epigastrium, radiating to her back, associated with nausea, and which lasted for a few hours and went away on its own. Patient no longer complaining of abdominal pain. She was planned for outpatient cholecystectomy on 6/15/20.    Allergies:  penicillin (Rash)    Home Medications:    · 	escitalopram:   · 	Cartia XT:   · 	donepezil:   · 	trospium:   · 	Low Dose ASA 81 mg oral tablet:     Hospital Medications:  acetaminophen   Tablet .. 650 milliGRAM(s) Oral every 6 hours PRN  diltiazem    Tablet 90 milliGRAM(s) Oral daily  donepezil 10 milliGRAM(s) Oral at bedtime  escitalopram 10 milliGRAM(s) Oral daily  oxybutynin 5 milliGRAM(s) Oral two times a day    PMHX/PSHX:  Memory changes  Balance disorder  Bladder incontinence  HTN (hypertension)  No significant past surgical history    Family history:     Denies family history of colon cancer/polyps, stomach cancer/polyps, pancreatic cancer/masses, liver cancer/disease, ovarian cancer and endometrial cancer.    Social History:     Tob: Denies  EtOH: Denies  Illicit Drugs: Denies    ROS:     General:  No wt loss, fevers, chills, night sweats, fatigue  Eyes:  Good vision, no reported pain  ENT:  No sore throat, pain, runny nose, dysphagia  CV:  No pain, palpitations, hypo/hypertension  Pulm:  No dyspnea, cough, tachypnea, wheezing  GI:  + pain (resolved), + nausea (resolved), No vomiting, No diarrhea, No constipation, No weight loss, No fever, No pruritis, No rectal bleeding, No tarry stools, No dysphagia,  :  No pain, bleeding, incontinence, nocturia  Muscle:  No pain, weakness  Neuro:  No weakness, tingling, memory problems  Psych:  No fatigue, insomnia, mood problems, depression  Endocrine:  No polyuria, polydipsia, cold/heat intolerance  Heme:  No petechiae, ecchymosis, easy bruisability  Skin:  No rash, tattoos, scars, edema    PHYSICAL EXAM:     GENERAL:  No acute distress  HEENT:  Normocephalic/atraumatic, no scleral icterus  CHEST:  Normal effort, no accessory muscle use  HEART:  Regular rate and rhythm, no murmurs/rubs/gallops  ABDOMEN:  Soft, non-tender, non-distended, normoactive bowel sounds  EXTREMITIES: No cyanosis, clubbing, or edema  SKIN:  No rash/erythema  NEURO:  Alert and oriented x 2, no asterixis    Vital Signs:  Vital Signs Last 24 Hrs  T(C): 36.7 (2020 10:46), Max: 36.9 (2020 06:18)  T(F): 98 (2020 10:46), Max: 98.4 (2020 06:18)  HR: 66 (2020 10:46) (66 - 80)  BP: 144/92 (2020 10:46) (144/92 - 152/80)  BP(mean): --  RR: 18 (2020 10:46) (16 - 18)  SpO2: 99% (2020 10:46) (97% - 99%)  Daily Height in cm: 154.94 (2020 06:18)    Daily     LABS:                        14.1   9.60  )-----------( 308      ( 2020 07:06 )             44.3     Mean Cell Volume: 94.7 fl (-20 @ 07:06)    06-07    139  |  101  |  29<H>  ----------------------------<  210<H>  3.8   |  26  |  0.86    Ca    9.4      2020 07:06    TPro  6.7  /  Alb  4.6  /  TBili  0.2  /  DBili  x   /  AST  12  /  ALT  11  /  AlkPhos  85  06-07    LIVER FUNCTIONS - ( 2020 07:06 )  Alb: 4.6 g/dL / Pro: 6.7 g/dL / ALK PHOS: 85 U/L / ALT: 11 U/L / AST: 12 U/L / GGT: x             Urinalysis Basic - ( 2020 09:57 )    Color: Colorless / Appearance: Clear / S.038 / pH: x  Gluc: x / Ketone: Negative  / Bili: Negative / Urobili: Negative   Blood: x / Protein: Negative / Nitrite: Negative   Leuk Esterase: Negative / RBC: 1 /hpf / WBC 2 /HPF   Sq Epi: x / Non Sq Epi: 3 /hpf / Bacteria: Negative      Amylase Serum--      Lipase serum30       Ammonia--                          14.1   9.60  )-----------( 308      ( 2020 07:06 )             44.3       Imaging:    < from: CT Abdomen and Pelvis w/ IV Cont (20 @ 08:39) >    EXAM:  CT ABDOMEN AND PELVIS IC                            PROCEDURE DATE:  2020            INTERPRETATION:  CLINICAL INFORMATION: Right upper quadrant pain. Recent history of ERCP.    COMPARISON: None.    PROCEDURE:       FINDINGS:  LOWER CHEST: Within normal limits.    LIVER: Within normal limits.  BILE DUCTS: Common bile duct measures 8 mm. Mild central intrahepatic biliary ductal dilatation.  GALLBLADDER: Cholelithiasis.  SPLEEN: Within normal limits.  PANCREAS: Within normal limits.  ADRENALS: Within normal limits.  KIDNEYS/URETERS: Bilateral subcentimeter hypodensities too small to characterize, however are likely cysts. No hydronephrosis. Kidneys enhance symmetrically.    BLADDER: Within normal limits.  REPRODUCTIVE ORGANS: Uterus and adnexa within normal limits.    BOWEL: No bowel obstruction. Colonic diverticulosis without evidence of acute diverticulitis. Appendix is not visualized, however, there is no right lower quadrant inflammation.  PERITONEUM: No ascites.  VESSELS: The aorta is normal in caliber. Major branches are approximately patent. Aortic calcifications. The portal vein and hepatic veins are patent.  RETROPERITONEUM/LYMPH NODES: No lymphadenopathy.    ABDOMINAL WALL: Tiny fat-containing umbilical hernia.  BONES: Scoliosis. Mild degenerative changes.    IMPRESSION:   1.  Cholelithiasis.  2.  Common bile duct measures 8 mm.     RAHEEM MCDANIEL M.D., RADIOLOGY RESIDENT  This document has been electronically signed.  MANDY CEVALLOS M.D., ATTENDING RADIOLOGIST  This document has been electronically signed. 2020  9:41AM      < end of copied text >

## 2020-06-07 NOTE — H&P ADULT - PROBLEM SELECTOR PLAN 4
DVT:   Diet: DVT: SCD pending surgical eval  Diet: Regular, NPO after MN for GI eval DVT: SCD pending surgical eval  Diet: NPO pending GI/surgical eval

## 2020-06-07 NOTE — ED ADULT NURSE NOTE - NSIMPLEMENTINTERV_GEN_ALL_ED
Implemented All Universal Safety Interventions:  [place] to call system. Call bell, personal items and telephone within reach. Instruct patient to call for assistance. Room bathroom lighting operational. Non-slip footwear when patient is off stretcher. Physically safe environment: no spills, clutter or unnecessary equipment. Stretcher in lowest position, wheels locked, appropriate side rails in place.

## 2020-06-07 NOTE — H&P ADULT - NSHPLABSRESULTS_GEN_ALL_CORE
Labs Personally Reviewed:     - WBC 9.6, Hgb 14, Plt 300  - Na 139, K 3.8, HCO3 26, BUN 29, Cr 0.86  - AST 12, ALT 11, T-Bili 0.2  - Lactate 1.6                        14.1   9.60  )-----------( 308      ( 2020 07:06 )             44.3       06-07    139  |  101  |  29<H>  ----------------------------<  210<H>  3.8   |  26  |  0.86    Ca    9.4      2020 07:06    TPro  6.7  /  Alb  4.6  /  TBili  0.2  /  DBili  x   /  AST  12  /  ALT  11  /  AlkPhos  85  06-07    Urinalysis Basic - ( 2020 09:57 )    Color: Colorless / Appearance: Clear / S.038 / pH: x  Gluc: x / Ketone: Negative  / Bili: Negative / Urobili: Negative   Blood: x / Protein: Negative / Nitrite: Negative   Leuk Esterase: Negative / RBC: 1 /hpf / WBC 2 /HPF   Sq Epi: x / Non Sq Epi: 3 /hpf / Bacteria: Negative    Radiology personally reviewed:     CT Abd w/ IV contrast 2020:   - Cholelithiasis.  - Common bile duct measures 8 mm.   - LIVER: Within normal limits.  - BILE DUCTS: Common bile duct measures 8 mm. Mild central intrahepatic biliary ductal dilatation.  - GALLBLADDER: Cholelithiasis.    ERCP 2020:   - A biliary sphincterotomy was created.  - A balloon catheter was used to remove a stone.   - There was excellent flow of bile and contrast.    EKG pending: Labs Personally Reviewed:     - WBC 9.6, Hgb 14, Plt 300  - Na 139, K 3.8, HCO3 26, BUN 29, Cr 0.86  - AST 12, ALT 11, T-Bili 0.2  - Lactate 1.6                        14.1   9.60  )-----------( 308      ( 2020 07:06 )             44.3       06-07    139  |  101  |  29<H>  ----------------------------<  210<H>  3.8   |  26  |  0.86    Ca    9.4      2020 07:06    TPro  6.7  /  Alb  4.6  /  TBili  0.2  /  DBili  x   /  AST  12  /  ALT  11  /  AlkPhos  85  06-07    Urinalysis Basic - ( 2020 09:57 )    Color: Colorless / Appearance: Clear / S.038 / pH: x  Gluc: x / Ketone: Negative  / Bili: Negative / Urobili: Negative   Blood: x / Protein: Negative / Nitrite: Negative   Leuk Esterase: Negative / RBC: 1 /hpf / WBC 2 /HPF   Sq Epi: x / Non Sq Epi: 3 /hpf / Bacteria: Negative    Radiology personally reviewed:     CT Abd w/ IV contrast 2020:   - Cholelithiasis.  - Common bile duct measures 8 mm.   - LIVER: Within normal limits.  - BILE DUCTS: Common bile duct measures 8 mm. Mild central intrahepatic biliary ductal dilatation.  - GALLBLADDER: Cholelithiasis.    ERCP 2020:   - A biliary sphincterotomy was created.  - A balloon catheter was used to remove a stone.   - There was excellent flow of bile and contrast.    EKG pending: NSR at 69 bpm with APCs,

## 2020-06-07 NOTE — CONSULT NOTE ADULT - ASSESSMENT
68 yo woman w/ mild dementia/imbalance, cholelithiasis, recent choledocholithiasis (s/p 5 mm stone removal via ERCP 5/22/2020 by Dr. Elizondo) now presenting with symptoms of biliary colic.     - No acute surgical intervention  - Would likely benefit from lap merced on this admission with Dr. Ambrose, attending to see tomorrow  - Trend LFTS, if increasing would recommend GI consult, repeat ERCP vs MRCP  - If febrile or WBC increasing and worsening tenderness would start cefotetan for abx  - NPO with meds for now    D/W Dr. Ambrose

## 2020-06-07 NOTE — H&P ADULT - HISTORY OF PRESENT ILLNESS
68 yo woman w/ mild dementia, cholelithiasis, choledocholithiasis (s/p 5 mm stone removal via ERCP 5/22/2020), presetting with acute L-sided abdominal pain associated with nausea (no emesis).     Pt was noted with transaminitis on routine lab work in April 2020 (, , , Bili 1.3. MRCP 4/27 revealed 8 mm CBD, 5-mm stone in the distal intrapancreatic bile duct, and a stone filled gallbladder. Pt s/p stone removal via ERCP 5/22/2020.     Pt denies fevers, chills, chest pain, SOB, cough, dysuria, recent travel or sick contacts    EDVS: afebrile, HR 80, /80, SpO2 99% RA    ED gave:   - 1L NS @ 6:00 AM 70 yo woman w/ mild dementia, cholelithiasis, choledocholithiasis (s/p 5 mm stone removal via ERCP 5/22/2020), presetting with acute epigastric abdominal pain associated with nausea (no emesis). Pain was sharp in nature, rate 8/10, radiation to the back, unrelieved by Advil, duration ~2 hr, resolved spontaneously.     Pt was noted with transaminitis on routine lab work in April 2020 (, , , Bili 1.3. MRCP 4/27 revealed 8 mm CBD, 5-mm stone in the distal intrapancreatic bile duct, and a stone filled gallbladder. Pt s/p stone removal via ERCP 5/22/2020.     Pt denies fevers, chills, chest pain, SOB, cough, dysuria, recent travel or sick contacts    EDVS: afebrile, HR 80, /80, SpO2 99% RA    ED gave:   - 1L NS @ 6:00 AM 68 yo woman w/ mild dementia/imbalance, cholelithiasis, choledocholithiasis (s/p 5 mm stone removal via ERCP 5/22/2020), presetting with acute epigastric abdominal pain associated with nausea (no emesis). Pain was sharp in nature, rate 8/10, radiation to the back, unrelieved by Advil, duration ~2 hr, resolved spontaneously.     Pt was noted with transaminitis on routine lab work in April 2020 (, , , Bili 1.3. MRCP 4/27 revealed 8 mm CBD, 5-mm stone in the distal intrapancreatic bile duct, and a stone filled gallbladder. Pt s/p stone removal via ERCP 5/22/2020.     Pt denies fevers, chills, chest pain, SOB, cough, dysuria, recent travel or sick contacts    EDVS: afebrile, HR 80, /80, SpO2 99% RA    ED gave:   - 1L NS @ 6:00 AM

## 2020-06-07 NOTE — H&P ADULT - NSHPPHYSICALEXAM_GEN_ALL_CORE
ICU Vital Signs Last 24 Hrs  T(C): 36.6 (07 Jun 2020 07:16), Max: 36.9 (07 Jun 2020 06:18)  T(F): 97.9 (07 Jun 2020 07:16), Max: 98.4 (07 Jun 2020 06:18)  HR: 80 (07 Jun 2020 07:16) (72 - 80)  BP: 152/80 (07 Jun 2020 07:16) (151/90 - 152/80)  RR: 16 (07 Jun 2020 07:16) (16 - 18)  SpO2: 99% (07 Jun 2020 07:16) (97% - 99%)    GENERAL: NAD, well appearing   EYES: EOMI, PERRL  ENT: MMM, no oropharyngeal lesions or erythema appreciated  Pulm: normal work of breathing, CTABL  CV: RRR, S1&S2+, no m/r/g appreciated  ABDOMEN: +BS, soft, +RUQ tenderness to palpation,  no hepatosplenomegaly  MSK: nl ROM  EXTREMITIES:  no appreciable edema in b/l LE  Neuro: A&Ox3, no focal deficits  SKIN: warm and dry, no visible rash ICU Vital Signs Last 24 Hrs  T(C): 36.6 (07 Jun 2020 07:16), Max: 36.9 (07 Jun 2020 06:18)  T(F): 97.9 (07 Jun 2020 07:16), Max: 98.4 (07 Jun 2020 06:18)  HR: 80 (07 Jun 2020 07:16) (72 - 80)  BP: 152/80 (07 Jun 2020 07:16) (151/90 - 152/80)  RR: 16 (07 Jun 2020 07:16) (16 - 18)  SpO2: 99% (07 Jun 2020 07:16) (97% - 99%)    GENERAL: NAD, well appearing   EYES: EOMI, PERRL  ENT: MMM, no oropharyngeal lesions or erythema appreciated  Pulm: normal work of breathing, CTABL  CV: RRR, S1&S2+, no m/r/g appreciated  ABDOMEN: +BS, soft, +RUQ tenderness to palpation,  no hepatosplenomegaly  MSK: nl ROM  EXTREMITIES:  no appreciable edema in b/l LE  Neuro: A&O to person, place, month, day, president but not year, CN 2-12 intact, 5/5 strength in upper and lower extrem b/l  SKIN: warm and dry, no visible rash

## 2020-06-07 NOTE — H&P ADULT - NSICDXPASTMEDICALHX_GEN_ALL_CORE_FT
PAST MEDICAL HISTORY:  Balance disorder     Bladder incontinence     HTN (hypertension)     Memory changes

## 2020-06-07 NOTE — CONSULT NOTE ADULT - ASSESSMENT
Impression:    70 y/o F w/ hx of HTN, dementia, cholelithiasis, and choledocholithiasis s/p ERCP on 5/22/20 with biliary sphincterotomy and balloon sweeps with removal of bile duct stone, presenting with abdominal pain, found to have dilated CBD on CT A/P.    # Abdominal pain: Concern for biliary colic vs choledocholithiasis. Abdominal pain has resolved and liver enzymes are normal.  # Cholelithiasis  # Dementia  # COVID-19 testing: Negative on 6/7/20    Recommendations:  - Would check MRI/MRCP w/ and w/o contrast  - Pain control per primary team  - Rest of care per primary team    Kevin Ornelas MD  Gastroenterology Fellow  Please contact via [employer] Teams    Please call [phone] to speak to answering service for on-call GI fellow. Can also e-mail [email or web address]. Impression:    68 y/o F w/ hx of HTN, dementia, cholelithiasis, and choledocholithiasis s/p ERCP on 5/22/20 with biliary sphincterotomy and balloon sweeps with removal of bile duct stone, presenting with abdominal pain, found to have dilated CBD on CT A/P.    # Abdominal pain: Concern for biliary colic vs choledocholithiasis. Abdominal pain has resolved and liver enzymes are normal.  # Cholelithiasis  # Dementia  # COVID-19 testing: Negative on 6/7/20    Recommendations:  - F/U Surgical recommendations  - Would check MRI/MRCP w/ and w/o contrast  - Pain control per primary team  - Rest of care per primary team    Kevin Ornelas MD  Gastroenterology Fellow  Please contact via Microsoft Teams    Please call [phone] to speak to answering service for on-call GI fellow. Can also e-mail [email or web address].

## 2020-06-07 NOTE — ED ADULT NURSE REASSESSMENT NOTE - NS ED NURSE REASSESS COMMENT FT1
received report from JAMARCUS Aponte. received report from JAMARCUS Aponte. patient is a&ox3, skin warm and dry, VSS, resting comfortably in bed.  is at the bedside and safety maintained

## 2020-06-07 NOTE — H&P ADULT - ASSESSMENT
68 yo woman w/ mild dementia, cholelithiasis, choledocholithiasis (s/p 5 mm stone removal via ERCP 5/22/2020), presetting with acute abdominal pain, CT w/ 8mm CBD, admitted for ERCP and cholecystectomy 70 yo woman w/ mild dementia, cholelithiasis, choledocholithiasis (s/p 5 mm stone removal via ERCP 5/22/2020), presetting with acute abdominal pain, CT w/ 8mm CBD, transaminases/bili/lipase wnl, admitted for cholecystectomy 70 yo woman w/ mild dementia/imbalance, cholelithiasis, hx choledocholithiasis (s/p 5 mm stone removal via ERCP 5/22/2020), presetting with acute abdominal pain, CT w/ 8mm CBD however transaminases/bili/lipase wnl, admitted for ERCP vs cholecystectomy

## 2020-06-07 NOTE — ED ADULT NURSE NOTE - OBJECTIVE STATEMENT
pt reports rlq abd pain that started 2 hrs ago and woke her out of sleep, pt has not had any fevers/dysuria, pt reports normal bowel patterns. pt denies nausea/vomitng.  pt is alert and oriented x3, speaking full clear sentences, respirations non-labored, skin warm dry and intact, strong pulses throughout, no signs of any edema,  abd is soft and non-distended but tender in right lower quad, pt is able to move all extremities on command.

## 2020-06-07 NOTE — ED PROVIDER NOTE - NS ED ROS FT
Constitutional: no fevers no chills.   CV: no chest pain, no palpitations   Respiratory: no shortness of breath, no cough   GI:+ abdominal pain +nausea no vomiting   Neuro: no headache, no weakness, no numbness  all other ROS is negative except as documented as HPI.

## 2020-06-07 NOTE — ED PROVIDER NOTE - PHYSICAL EXAMINATION
Physical Exam:  Gen: NAD, non-toxic appearing, awake alert   Head: NCAT  HEENT: normal conjunctiva, oral mucosa moist  Lung: CTAB, no respiratory distress, no wheezes/rhonchi/rales B/L, speaking in full sentences  CV: Irregular rhythm, regular rate, no murmurs, rubs or gallops  Abd: soft, +ttp in RUQ w/ guarding, ND,no rigidity, no rebound tenderness, no CVA tenderness   MSK: no visible deformities, ROM normal in UE/LE  Neuro: No focal sensory or motor deficits  Skin: Warm, well perfused, no rash, non-pitting edema in b/l LE  Psych: normal affect, calm  ~Emiliana Faith D.O. -Resident

## 2020-06-08 ENCOUNTER — TRANSCRIPTION ENCOUNTER (OUTPATIENT)
Age: 70
End: 2020-06-08

## 2020-06-08 LAB
ALBUMIN SERPL ELPH-MCNC: 4.1 G/DL — SIGNIFICANT CHANGE UP (ref 3.3–5)
ALP SERPL-CCNC: 64 U/L — SIGNIFICANT CHANGE UP (ref 40–120)
ALT FLD-CCNC: 10 U/L — SIGNIFICANT CHANGE UP (ref 10–45)
ANION GAP SERPL CALC-SCNC: 12 MMOL/L — SIGNIFICANT CHANGE UP (ref 5–17)
APTT BLD: 24.3 SEC — LOW (ref 27.5–36.3)
AST SERPL-CCNC: 12 U/L — SIGNIFICANT CHANGE UP (ref 10–40)
BASOPHILS # BLD AUTO: 0.05 K/UL — SIGNIFICANT CHANGE UP (ref 0–0.2)
BASOPHILS NFR BLD AUTO: 0.8 % — SIGNIFICANT CHANGE UP (ref 0–2)
BILIRUB SERPL-MCNC: 0.8 MG/DL — SIGNIFICANT CHANGE UP (ref 0.2–1.2)
BLD GP AB SCN SERPL QL: NEGATIVE — SIGNIFICANT CHANGE UP
BUN SERPL-MCNC: 12 MG/DL — SIGNIFICANT CHANGE UP (ref 7–23)
CALCIUM SERPL-MCNC: 9.4 MG/DL — SIGNIFICANT CHANGE UP (ref 8.4–10.5)
CHLORIDE SERPL-SCNC: 105 MMOL/L — SIGNIFICANT CHANGE UP (ref 96–108)
CO2 SERPL-SCNC: 26 MMOL/L — SIGNIFICANT CHANGE UP (ref 22–31)
CREAT SERPL-MCNC: 0.75 MG/DL — SIGNIFICANT CHANGE UP (ref 0.5–1.3)
CULTURE RESULTS: SIGNIFICANT CHANGE UP
EOSINOPHIL # BLD AUTO: 0.13 K/UL — SIGNIFICANT CHANGE UP (ref 0–0.5)
EOSINOPHIL NFR BLD AUTO: 2 % — SIGNIFICANT CHANGE UP (ref 0–6)
GLUCOSE SERPL-MCNC: 101 MG/DL — HIGH (ref 70–99)
HCT VFR BLD CALC: 42.2 % — SIGNIFICANT CHANGE UP (ref 34.5–45)
HCV AB S/CO SERPL IA: 0.05 S/CO — SIGNIFICANT CHANGE UP (ref 0–0.99)
HCV AB SERPL-IMP: SIGNIFICANT CHANGE UP
HGB BLD-MCNC: 13.6 G/DL — SIGNIFICANT CHANGE UP (ref 11.5–15.5)
IMM GRANULOCYTES NFR BLD AUTO: 0.3 % — SIGNIFICANT CHANGE UP (ref 0–1.5)
INR BLD: 0.99 RATIO — SIGNIFICANT CHANGE UP (ref 0.88–1.16)
LYMPHOCYTES # BLD AUTO: 2.11 K/UL — SIGNIFICANT CHANGE UP (ref 1–3.3)
LYMPHOCYTES # BLD AUTO: 32.9 % — SIGNIFICANT CHANGE UP (ref 13–44)
MAGNESIUM SERPL-MCNC: 2.3 MG/DL — SIGNIFICANT CHANGE UP (ref 1.6–2.6)
MCHC RBC-ENTMCNC: 30.5 PG — SIGNIFICANT CHANGE UP (ref 27–34)
MCHC RBC-ENTMCNC: 32.2 GM/DL — SIGNIFICANT CHANGE UP (ref 32–36)
MCV RBC AUTO: 94.6 FL — SIGNIFICANT CHANGE UP (ref 80–100)
MONOCYTES # BLD AUTO: 0.5 K/UL — SIGNIFICANT CHANGE UP (ref 0–0.9)
MONOCYTES NFR BLD AUTO: 7.8 % — SIGNIFICANT CHANGE UP (ref 2–14)
NEUTROPHILS # BLD AUTO: 3.61 K/UL — SIGNIFICANT CHANGE UP (ref 1.8–7.4)
NEUTROPHILS NFR BLD AUTO: 56.2 % — SIGNIFICANT CHANGE UP (ref 43–77)
NRBC # BLD: 0 /100 WBCS — SIGNIFICANT CHANGE UP (ref 0–0)
PHOSPHATE SERPL-MCNC: 2.8 MG/DL — SIGNIFICANT CHANGE UP (ref 2.5–4.5)
PLATELET # BLD AUTO: 280 K/UL — SIGNIFICANT CHANGE UP (ref 150–400)
POTASSIUM SERPL-MCNC: 3.4 MMOL/L — LOW (ref 3.5–5.3)
POTASSIUM SERPL-SCNC: 3.4 MMOL/L — LOW (ref 3.5–5.3)
PROT SERPL-MCNC: 6 G/DL — SIGNIFICANT CHANGE UP (ref 6–8.3)
PROTHROM AB SERPL-ACNC: 11.3 SEC — SIGNIFICANT CHANGE UP (ref 10–13.1)
RBC # BLD: 4.46 M/UL — SIGNIFICANT CHANGE UP (ref 3.8–5.2)
RBC # FLD: 14.1 % — SIGNIFICANT CHANGE UP (ref 10.3–14.5)
RH IG SCN BLD-IMP: POSITIVE — SIGNIFICANT CHANGE UP
SODIUM SERPL-SCNC: 143 MMOL/L — SIGNIFICANT CHANGE UP (ref 135–145)
SPECIMEN SOURCE: SIGNIFICANT CHANGE UP
WBC # BLD: 6.42 K/UL — SIGNIFICANT CHANGE UP (ref 3.8–10.5)
WBC # FLD AUTO: 6.42 K/UL — SIGNIFICANT CHANGE UP (ref 3.8–10.5)

## 2020-06-08 PROCEDURE — 47562 LAPAROSCOPIC CHOLECYSTECTOMY: CPT | Mod: GC

## 2020-06-08 PROCEDURE — 99221 1ST HOSP IP/OBS SF/LOW 40: CPT | Mod: GC

## 2020-06-08 PROCEDURE — 88304 TISSUE EXAM BY PATHOLOGIST: CPT | Mod: 26

## 2020-06-08 PROCEDURE — 74181 MRI ABDOMEN W/O CONTRAST: CPT | Mod: 26

## 2020-06-08 PROCEDURE — 99233 SBSQ HOSP IP/OBS HIGH 50: CPT

## 2020-06-08 RX ORDER — IBUPROFEN 200 MG
400 TABLET ORAL EVERY 6 HOURS
Refills: 0 | Status: DISCONTINUED | OUTPATIENT
Start: 2020-06-08 | End: 2020-06-10

## 2020-06-08 RX ORDER — HYDROMORPHONE HYDROCHLORIDE 2 MG/ML
0.25 INJECTION INTRAMUSCULAR; INTRAVENOUS; SUBCUTANEOUS
Refills: 0 | Status: DISCONTINUED | OUTPATIENT
Start: 2020-06-08 | End: 2020-06-08

## 2020-06-08 RX ORDER — ACETAMINOPHEN 500 MG
650 TABLET ORAL EVERY 6 HOURS
Refills: 0 | Status: DISCONTINUED | OUTPATIENT
Start: 2020-06-08 | End: 2020-06-10

## 2020-06-08 RX ORDER — SODIUM CHLORIDE 9 MG/ML
1000 INJECTION, SOLUTION INTRAVENOUS
Refills: 0 | Status: DISCONTINUED | OUTPATIENT
Start: 2020-06-08 | End: 2020-06-08

## 2020-06-08 RX ORDER — ENOXAPARIN SODIUM 100 MG/ML
40 INJECTION SUBCUTANEOUS DAILY
Refills: 0 | Status: DISCONTINUED | OUTPATIENT
Start: 2020-06-08 | End: 2020-06-10

## 2020-06-08 RX ADMIN — ESCITALOPRAM OXALATE 10 MILLIGRAM(S): 10 TABLET, FILM COATED ORAL at 11:41

## 2020-06-08 RX ADMIN — Medication 90 MILLIGRAM(S): at 05:16

## 2020-06-08 RX ADMIN — ENOXAPARIN SODIUM 40 MILLIGRAM(S): 100 INJECTION SUBCUTANEOUS at 19:50

## 2020-06-08 RX ADMIN — Medication 650 MILLIGRAM(S): at 19:51

## 2020-06-08 RX ADMIN — SODIUM CHLORIDE 75 MILLILITER(S): 9 INJECTION INTRAMUSCULAR; INTRAVENOUS; SUBCUTANEOUS at 11:41

## 2020-06-08 RX ADMIN — DONEPEZIL HYDROCHLORIDE 10 MILLIGRAM(S): 10 TABLET, FILM COATED ORAL at 21:47

## 2020-06-08 RX ADMIN — Medication 5 MILLIGRAM(S): at 05:16

## 2020-06-08 RX ADMIN — Medication 5 MILLIGRAM(S): at 19:50

## 2020-06-08 NOTE — DISCHARGE NOTE PROVIDER - NSDCMRMEDTOKEN_GEN_ALL_CORE_FT
Cartia XT:   donepezil:   escitalopram:   Low Dose ASA 81 mg oral tablet:   trospium: Cartia XT:   donepezil:   escitalopram:   Low Dose ASA 81 mg oral tablet: Resume taking 6/11/20  trospium:

## 2020-06-08 NOTE — PROGRESS NOTE ADULT - PROBLEM SELECTOR PLAN 5
Transitions of Care Status:  1.  Name of PCP: Dr. Kj Ramirez   2.  PCP Contacted on Admission: [ ] Y    [ ] N    3.  PCP contacted at Discharge: [ ] Y    [ ] N    [ ] N/A  4.  Post-Discharge Appointment Date and Location:  5.  Summary of Handoff given to PCP:

## 2020-06-08 NOTE — PROGRESS NOTE ADULT - PROBLEM SELECTOR PLAN 1
S/p ERCP with 5mm stone removal 5/22/2020   CT abd 6/7/2020: CBD 8mm. Of note CBD dilitaiton does not resolved for >3mo in 30% of individuals.   Transaminases, bilirubin and lipase wnl  f/u MRCP  - surgery for lap merced today   Pt currently asymptomatic, PRN Tylenol for pain management.

## 2020-06-08 NOTE — CHART NOTE - NSCHARTNOTEFT_GEN_A_CORE
Pt not in room at time of my visit this morning (away for MRI)  Also not in room during the afternoon (OR)    See GI consult note dated 6/7/20 for details.  Documented Medicine physical exam reviewed 6/8/20.    Impression:    Choledocholithiasis without cholangitis.  Cholelithiasis.    Recommendations:    Follow CBC/LFTs  Diet per surgery  ERCP on 6/10/20 Pt not in room at time of my visit this morning (away for MRI)  Also not in room during the afternoon (OR)    See GI consult note dated 6/7/20 for details.  Documented Medicine physical exam reviewed 6/8/20.    Labs reviewed.  No significant LFT abnormalities.  Imaging reviewed.  CBD 8 mm.    Impression:    Choledocholithiasis without cholangitis.  Cholelithiasis, s/p lap cholecystectomy 6/8/20.    Recommendations:    Follow CBC/LFTs  Diet per surgery  ERCP on 6/10/20

## 2020-06-08 NOTE — PROGRESS NOTE ADULT - SUBJECTIVE AND OBJECTIVE BOX
GENERAL SURGERY DAILY PROGRESS NOTE:    Interval:  No acute events overnight endorsed.    Subjective:  Patient seen and examined this am. Pt denies fevers, chills, chest pain, SOB, cough, dysuria    Vital Signs Last 24 Hrs  T(C): 36.9 (2020 23:24), Max: 37 (2020 14:32)  T(F): 98.5 (2020 23:24), Max: 98.6 (2020 14:32)  HR: 62 (2020 23:24) (62 - 80)  BP: 133/76 (2020 23:24) (126/78 - 152/80)  RR: 18 (2020 23:24) (16 - 18)  SpO2: 97% (2020 23:24) (97% - 100%)    Gen: NAD  Neuro: AAOx3  HEENT: normocephalic, atraumatic, no scleral icterus  CV: S1, S2, RRR  Pulm: CTA B/L  Abd: Soft, ND, mild tenderness in RUQ, no rebound, no guarding, no palpable organomegaly/masses  Ext: warm, no edema    I&O's Detail    2020 07:01  -  2020 01:50  --------------------------------------------------------  IN:  Total IN: 0 mL    OUT:  Total OUT: 0 mL    Total NET: 0 mL          Daily Height in cm: 154.94 (2020 06:18)    Daily     MEDICATIONS  (STANDING):  diltiazem    Tablet 90 milliGRAM(s) Oral daily  donepezil 10 milliGRAM(s) Oral at bedtime  escitalopram 10 milliGRAM(s) Oral daily  oxybutynin 5 milliGRAM(s) Oral two times a day  sodium chloride 0.9%. 1000 milliLiter(s) (75 mL/Hr) IV Continuous <Continuous>    MEDICATIONS  (PRN):  acetaminophen   Tablet .. 650 milliGRAM(s) Oral every 6 hours PRN Temp greater or equal to 38C (100.4F), Mild Pain (1 - 3), Moderate Pain (4 - 6), Severe Pain (7 - 10)      LABS:                        14.1   9.60  )-----------( 308      ( 2020 07:06 )             44.3     06-07    139  |  101  |  29<H>  ----------------------------<  210<H>  3.8   |  26  |  0.86    Ca    9.4      2020 07:06    TPro  6.7  /  Alb  4.6  /  TBili  0.2  /  DBili  x   /  AST  12  /  ALT  11  /  AlkPhos  85  06-07      Urinalysis Basic - ( 2020 09:57 )    Color: Colorless / Appearance: Clear / S.038 / pH: x  Gluc: x / Ketone: Negative  / Bili: Negative / Urobili: Negative   Blood: x / Protein: Negative / Nitrite: Negative   Leuk Esterase: Negative / RBC: 1 /hpf / WBC 2 /HPF   Sq Epi: x / Non Sq Epi: 3 /hpf / Bacteria: Negative        MAGED Sims, PGY-1  [place] [facility] Surgery  [id number] GENERAL SURGERY DAILY PROGRESS NOTE:    Interval:  No acute events overnight reported.    Subjective:  Patient seen and examined this am. Pt denies fevers, chills, chest pain, SOB, cough, dysuria    Vital Signs Last 24 Hrs  T(C): 36.9 (2020 23:24), Max: 37 (2020 14:32)  T(F): 98.5 (2020 23:24), Max: 98.6 (2020 14:32)  HR: 62 (2020 23:24) (62 - 80)  BP: 133/76 (2020 23:24) (126/78 - 152/80)  RR: 18 (2020 23:24) (16 - 18)  SpO2: 97% (2020 23:24) (97% - 100%)    Gen: NAD  Neuro: AAOx3  HEENT: normocephalic, atraumatic, no scleral icterus  CV: S1, S2, RRR  Pulm: CTA B/L  Abd: Soft, ND, mild tenderness in RUQ, no rebound, no guarding, no palpable organomegaly/masses  Ext: warm, no edema    I&O's Detail    2020 07:01  -  2020 01:50  --------------------------------------------------------  IN:  Total IN: 0 mL    OUT:  Total OUT: 0 mL    Total NET: 0 mL          Daily Height in cm: 154.94 (2020 06:18)    Daily     MEDICATIONS  (STANDING):  diltiazem    Tablet 90 milliGRAM(s) Oral daily  donepezil 10 milliGRAM(s) Oral at bedtime  escitalopram 10 milliGRAM(s) Oral daily  oxybutynin 5 milliGRAM(s) Oral two times a day  sodium chloride 0.9%. 1000 milliLiter(s) (75 mL/Hr) IV Continuous <Continuous>    MEDICATIONS  (PRN):  acetaminophen   Tablet .. 650 milliGRAM(s) Oral every 6 hours PRN Temp greater or equal to 38C (100.4F), Mild Pain (1 - 3), Moderate Pain (4 - 6), Severe Pain (7 - 10)      LABS:                        14.1   9.60  )-----------( 308      ( 2020 07:06 )             44.3     06-07    139  |  101  |  29<H>  ----------------------------<  210<H>  3.8   |  26  |  0.86    Ca    9.4      2020 07:06    TPro  6.7  /  Alb  4.6  /  TBili  0.2  /  DBili  x   /  AST  12  /  ALT  11  /  AlkPhos  85  06-07      Urinalysis Basic - ( 2020 09:57 )    Color: Colorless / Appearance: Clear / S.038 / pH: x  Gluc: x / Ketone: Negative  / Bili: Negative / Urobili: Negative   Blood: x / Protein: Negative / Nitrite: Negative   Leuk Esterase: Negative / RBC: 1 /hpf / WBC 2 /HPF   Sq Epi: x / Non Sq Epi: 3 /hpf / Bacteria: Negative

## 2020-06-08 NOTE — DISCHARGE NOTE PROVIDER - CARE PROVIDERS DIRECT ADDRESSES
,[email or web address] [email or web address]

## 2020-06-08 NOTE — DISCHARGE NOTE PROVIDER - PROVIDER TOKENS
PROVIDER:[TOKEN:[[id number]]],PROVIDER:[TOKEN:[[id number]]] PROVIDER:[TOKEN:[[id number]]],PROVIDER:[TOKEN:[[id number]]],PROVIDER:[TOKEN:[[id number]],FOLLOWUP:[2 weeks]]

## 2020-06-08 NOTE — DISCHARGE NOTE PROVIDER - CARE PROVIDER_API CALL
Mitch Ambrose  COLON/RECTAL SURGERY  [address] [place] [address]  Phone: [phone]  Fax: [phone]  Follow Up Time:     Ld Mclaughlin  GASTROENTEROLOGY  [address]  [place] [address]  Phone: [phone]  Fax: [phone]  Follow Up Time: Mitch Ambrose  COLON/RECTAL SURGERY  [address] [place] [address]  Phone: [phone]  Fax: [phone]  Follow Up Time:     Ld Mclaughlin  GASTROENTEROLOGY  [address] [place] [address]  [place] [address]  Phone: [phone]  Fax: [phone]  Follow Up Time:     Price La  GASTROENTEROLOGY  [address] [place] [id number]  Phone: [phone]  Fax: [phone]  Follow Up Time: 2 weeks

## 2020-06-08 NOTE — PROGRESS NOTE ADULT - SUBJECTIVE AND OBJECTIVE BOX
[facility] Division of Hospital Medicine  Myles Cheatham DO  Pager (TERRY-F, 8A-5P): [phone]  Other Times:  [phone]    Patient is a 70y old  Female who presents with a chief complaint of Abdominal pain (2020 01:49)      SUBJECTIVE / OVERNIGHT EVENTS:    patient seen and examined. feels well  no abdominal pain, fevers or chills    MEDICATIONS  (STANDING):  diltiazem    Tablet 90 milliGRAM(s) Oral daily  donepezil 10 milliGRAM(s) Oral at bedtime  escitalopram 10 milliGRAM(s) Oral daily  oxybutynin 5 milliGRAM(s) Oral two times a day  sodium chloride 0.9%. 1000 milliLiter(s) (75 mL/Hr) IV Continuous <Continuous>    MEDICATIONS  (PRN):  acetaminophen   Tablet .. 650 milliGRAM(s) Oral every 6 hours PRN Temp greater or equal to 38C (100.4F), Mild Pain (1 - 3), Moderate Pain (4 - 6), Severe Pain (7 - 10)      CAPILLARY BLOOD GLUCOSE        I&O's Summary    2020 07:01  -  2020 07:00  --------------------------------------------------------  IN: 0 mL / OUT: 0 mL / NET: 0 mL        PHYSICAL EXAM:  Vital Signs Last 24 Hrs  T(C): 36.7 (2020 13:26), Max: 37 (2020 14:32)  T(F): 98.1 (2020 13:17), Max: 98.6 (2020 14:32)  HR: 81 (2020 13:26) (62 - 81)  BP: 164/94 (2020 13:26) (126/78 - 164/94)  BP(mean): --  RR: 16 (2020 13:26) (16 - 18)  SpO2: 100% (2020 13:26) (97% - 100%)       GENERAL: NAD, well appearing    Chest:  CTA b/l no wheezes, rales or rhonchi  CV: RRR, S1&S2+, no m/r/g appreciated  ABDOMEN: soft NT ND BS+  no hepatosplenomegaly  EXTREMITIES:  no edema cyanosis or clubbing SKIN: warm and dry, no visible rash	    LABS:                        13.6   6.42  )-----------( 280      ( 2020 06:37 )             42.2     06-08    143  |  105  |  12  ----------------------------<  101<H>  3.4<L>   |  26  |  0.75    Ca    9.4      2020 06:36  Phos  2.8     06-08  Mg     2.3     06-08    TPro  6.0  /  Alb  4.1  /  TBili  0.8  /  DBili  x   /  AST  12  /  ALT  10  /  AlkPhos  64  06-08    PT/INR - ( 2020 07:38 )   PT: 11.3 sec;   INR: 0.99 ratio         PTT - ( 2020 07:38 )  PTT:24.3 sec      Urinalysis Basic - ( 2020 09:57 )    Color: Colorless / Appearance: Clear / S.038 / pH: x  Gluc: x / Ketone: Negative  / Bili: Negative / Urobili: Negative   Blood: x / Protein: Negative / Nitrite: Negative   Leuk Esterase: Negative / RBC: 1 /hpf / WBC 2 /HPF   Sq Epi: x / Non Sq Epi: 3 /hpf / Bacteria: Negative        Culture - Urine (collected 2020 14:10)  Source: .Urine Clean Catch (Midstream)  Final Report (2020 08:43):    <10,000 CFU/mL Normal Urogenital Ethel        RADIOLOGY & ADDITIONAL TESTS:  Results Reviewed:   Imaging Personally Reviewed:  Electrocardiogram Personally Reviewed:    COORDINATION OF CARE:  Care Discussed with Consultants/Other Providers [Y/N]:  Prior or Outpatient Records Reviewed [Y/N]:  ALBERTO [place]

## 2020-06-08 NOTE — PROGRESS NOTE ADULT - ASSESSMENT
70 yo woman w/ mild dementia/imbalance, cholelithiasis, hx choledocholithiasis (s/p 5 mm stone removal via ERCP 5/22/2020), presetting with acute abdominal pain, CT w/ 8mm CBD however transaminases/bili/lipase wnl, admitted for ERCP vs cholecystectomy

## 2020-06-08 NOTE — PROGRESS NOTE ADULT - ASSESSMENT
68 yo woman w/ mild dementia/imbalance, cholelithiasis, recent choledocholithiasis (s/p 5 mm stone removal via ERCP 5/22/2020 by Dr. Elizondo) now presenting with symptoms of biliary colic.     Plan:  - Potential OR today 6/8 for lap merced with Dr. Ambrose  - Consented, consent in chart  - Trend LFTS, if increasing would recommend GI consult  - If febrile or WBC increasing and worsening tenderness would start cefotetan for abx  - NPO with meds for now    [employer] [facility]  [id number] 68 yo woman w/ mild dementia/imbalance, cholelithiasis, recent choledocholithiasis (s/p 5 mm stone removal via ERCP 5/22/2020 by Dr. Elizondo) now presenting with symptoms of biliary colic.     Plan:  - ap merced on this admission with Dr. Ambrose  - Consented, consent in chart  - Trend LFTS, if increasing would recommend GI consult  - If febrile or WBC increasing and worsening tenderness would start IV abx  - NPO with meds for now    [employer] Team Surgery  [id number]

## 2020-06-08 NOTE — DISCHARGE NOTE PROVIDER - HOSPITAL COURSE
70 yo woman w/ mild dementia/imbalance, cholelithiasis, recent choledocholithiasis (s/p 5 mm stone removal via ERCP 5/22/2020 by Dr. Elizondo), presents with acute epigastric abdominal pain associated with nausea (no emesis). Pain began this morning,  was sharp in nature, rate 8/10, radiation to the back, unrelieved by Advil, duration ~2 hr, resolved spontaneously. Pt was noted with transaminitis on routine lab work in April 2020 (, , , Bili 1.3. MRCP 4/27 revealed 8 mm CBD, 5-mm stone in the distal intrapancreatic bile duct, and a stone filled gallbladder. Pt s/p stone removal via ERCP 5/22/2020. Was planning for outpatient cholecystectomy with Dr. Ambrose on 6/15.Pt denies fevers, chills, chest pain, SOB, cough, dysuria, recent travel or sick/COVID contacts. No history of surgery.        In the ER the patient was afebrile, HR 80, /80, SpO2 99% RA. On exam she was mildly tender to deep palpation in the RUQ without rebound or guarding. CTAP revealed cholelithiasis with an 8mm CBD, mild intrahepatic bile duct dilation. Admitted to medicine.  Surgery and GI consulted, MRCP < from: MR MRCP No Cont (06.08.20 @ 10:42) >Cholelithiasis and choledocholithiasis with 3 mm stone in the distal CBD.  Mild intrahepatic and extra hepatic biliary ductal dilatation. < end of copied text >. On HD2, pt underwent lap merced. Pt tolerated procedure well, was extubated and sent to pacu then floor stable.  At the time of discharge, the patient was hemodynamically stable, was tolerating PO diet, was voiding urine, was ambulating, and was comfortable with adequate pain control. The patient was instructed to follow up with Dr. Ambrose within 1-2 weeks after discharge from the hospital. The patient/family felt comfortable with discharge. The patient was discharged to home. The patient had no other issues. 70 yo woman w/ mild dementia/imbalance, cholelithiasis, recent choledocholithiasis (s/p 5 mm stone removal via ERCP 5/22/2020 by Dr. Elizondo), presents with acute epigastric abdominal pain associated with nausea (no emesis). Pain began this morning,  was sharp in nature, rate 8/10, radiation to the back, unrelieved by Advil, duration ~2 hr, resolved spontaneously. Pt was noted with transaminitis on routine lab work in April 2020 (, , , Bili 1.3. MRCP 4/27 revealed 8 mm CBD, 5-mm stone in the distal intrapancreatic bile duct, and a stone filled gallbladder. Pt s/p stone removal via ERCP 5/22/2020. Was planning for outpatient cholecystectomy with Dr. Ambrose on 6/15.Pt denies fevers, chills, chest pain, SOB, cough, dysuria, recent travel or sick/COVID contacts. No history of surgery.        In the ER the patient was afebrile, HR 80, /80, SpO2 99% RA. On exam she was mildly tender to deep palpation in the RUQ without rebound or guarding. CTAP revealed cholelithiasis with an 8mm CBD, mild intrahepatic bile duct dilation. Admitted to medicine.  Surgery and GI consulted, MRCP < from: MR MRCP No Cont (06.08.20 @ 10:42) >Cholelithiasis and choledocholithiasis with 3 mm stone in the distal CBD.  Mild intrahepatic and extra hepatic biliary ductal dilatation. < end of copied text >. On HD2, pt underwent lap merced. Pt tolerated procedure well, was extubated and sent to pacu then floor stable. Ms. Raines is a 69 year-old woman with mild dementia, cholelithiasis, recent choledocholithiasis (s/p 5 mm stone removal via ERCP 5/22/2020 by Dr. Elizondo), presented with acute epigastric abdominal pain associated with nausea.         She was found to have cholelithiasis with an 8mm CBD, mild intrahepatic bile duct dilation on CT, and cholelithiasis and choledocholithiasis with 3 mm stone in the distal CBD. On HD2, the patient underwent laparoscopic cholecystectomy. She tolerated the procedure well, was extubated and sent to pacu then floor stable. Postoperatively, she was started on a regular diet. Her pain is well controlled. On HD3, she underwent ERCP with GI. She tolerated the procedure well***        On the day of discharge, she has been tolerating a regular diet and making adequate urine. She is ambulating and her pain is well controlled. She is hemodynamically stable for discharge home with follow up with Dr. Amborse and gastroenterologist with 2 weeks of discharge. Ms. Raines is a 69 year-old woman with mild dementia, cholelithiasis, recent choledocholithiasis (s/p 5 mm stone removal via ERCP 5/22/2020 by Dr. Elizondo), presented with acute epigastric abdominal pain associated with nausea.         She was found to have cholelithiasis with an 8mm CBD, mild intrahepatic bile duct dilation on CT, and cholelithiasis and choledocholithiasis with 3 mm stone in the distal CBD. On HD2, the patient underwent laparoscopic cholecystectomy. She tolerated the procedure well, was extubated and sent to pacu then floor stable. Postoperatively, she was started on a regular diet. Her pain is well controlled. On HD3, she underwent ERCP with GI. She tolerated the procedure well.         On the day of discharge, she has been tolerating a regular diet and making adequate urine. She is ambulating and her pain is well controlled. She is hemodynamically stable for discharge home with follow up with Dr. Ambrose and gastroenterologist with 2 weeks of discharge.

## 2020-06-08 NOTE — DISCHARGE NOTE PROVIDER - NSDCACTIVITY_GEN_ALL_CORE
Do not make important decisions/Walking - Outdoors allowed/Stairs allowed/Walking - Indoors allowed/Showering allowed/No heavy lifting/straining/Do not drive or operate machinery

## 2020-06-08 NOTE — CHART NOTE - NSCHARTNOTEFT_GEN_A_CORE
[id number]  RENE CHILEL is a 69yo female w/ mild dementia/imbalance, cholelithiasis, recent choledocholithiasis (s/p 5 mm stone removal via ERCP 5/22/2020 by Dr. Elizondo) now presenting with symptoms of biliary colic. She is now s/p laparoscopic cholecystectomy without complications.            Subjective: She is awake, alert and in good spirits. She has no complications at this time. She is without pain. She states, she has ambulated to the restroom, urinated, and tolerated CLD. She inquires about going home. Her  will arrive and be with her this evening.   Patient has dementia history; in discussion with RN, she was orthostatic upon standing and ambulating to the restroom and she had minimal urine output.     Objective:  T(C): 36.3 (06-08-20 @ 18:43), Max: 36.9 (06-07-20 @ 23:24)  HR: 99 (06-08-20 @ 18:43)  BP: 142/85 (06-08-20 @ 18:43)  RR: 18 (06-08-20 @ 18:43)  SpO2: 95% (06-08-20 @ 18:43)    Physical Exam:  GENERAL: A&Ox4, in NAD  HEENT: Normocephalic, atraumatic  CARDIO: RRR  RESP: CTAB, no increased WOB  GI: four lap incisions with minimal strikethrough, NT, ND  EXT: WWP, venodynes in place  NEURO: CNII-XII intact    acetaminophen   Tablet .. 650 milliGRAM(s) Oral every 6 hours PRN  diltiazem    Tablet 90 milliGRAM(s) Oral daily  donepezil 10 milliGRAM(s) Oral at bedtime  enoxaparin Injectable 40 milliGRAM(s) SubCutaneous daily  escitalopram 10 milliGRAM(s) Oral daily  oxybutynin 5 milliGRAM(s) Oral two times a day      Assessment: Patient is s/p laparoscopic cholecystectomy hours ago, healing appropriately.    Pain Control: Adequate  Regular Diet As Tolerated   DVT Prophylaxis: Lovenox  Out of Bed and encourage early ambulation  Incentive Spirometry  Appreciate GI Consult: Planned ERCP 6/10/2020        Red Surgery [phone]    Marcio Vogel M.D.   PGY1 - General Surgery   [facility]  Pager: [phone]

## 2020-06-09 LAB
ALBUMIN SERPL ELPH-MCNC: 4.2 G/DL — SIGNIFICANT CHANGE UP (ref 3.3–5)
ALP SERPL-CCNC: 64 U/L — SIGNIFICANT CHANGE UP (ref 40–120)
ALT FLD-CCNC: 37 U/L — SIGNIFICANT CHANGE UP (ref 10–45)
ANION GAP SERPL CALC-SCNC: 13 MMOL/L — SIGNIFICANT CHANGE UP (ref 5–17)
AST SERPL-CCNC: 38 U/L — SIGNIFICANT CHANGE UP (ref 10–40)
BILIRUB SERPL-MCNC: 0.6 MG/DL — SIGNIFICANT CHANGE UP (ref 0.2–1.2)
BUN SERPL-MCNC: 11 MG/DL — SIGNIFICANT CHANGE UP (ref 7–23)
CALCIUM SERPL-MCNC: 9.4 MG/DL — SIGNIFICANT CHANGE UP (ref 8.4–10.5)
CHLORIDE SERPL-SCNC: 101 MMOL/L — SIGNIFICANT CHANGE UP (ref 96–108)
CO2 SERPL-SCNC: 24 MMOL/L — SIGNIFICANT CHANGE UP (ref 22–31)
CREAT SERPL-MCNC: 0.71 MG/DL — SIGNIFICANT CHANGE UP (ref 0.5–1.3)
GLUCOSE SERPL-MCNC: 154 MG/DL — HIGH (ref 70–99)
HCT VFR BLD CALC: 40 % — SIGNIFICANT CHANGE UP (ref 34.5–45)
HGB BLD-MCNC: 13.1 G/DL — SIGNIFICANT CHANGE UP (ref 11.5–15.5)
MAGNESIUM SERPL-MCNC: 2.1 MG/DL — SIGNIFICANT CHANGE UP (ref 1.6–2.6)
MCHC RBC-ENTMCNC: 30.5 PG — SIGNIFICANT CHANGE UP (ref 27–34)
MCHC RBC-ENTMCNC: 32.8 GM/DL — SIGNIFICANT CHANGE UP (ref 32–36)
MCV RBC AUTO: 93.2 FL — SIGNIFICANT CHANGE UP (ref 80–100)
NRBC # BLD: 0 /100 WBCS — SIGNIFICANT CHANGE UP (ref 0–0)
PHOSPHATE SERPL-MCNC: 2.9 MG/DL — SIGNIFICANT CHANGE UP (ref 2.5–4.5)
PLATELET # BLD AUTO: 288 K/UL — SIGNIFICANT CHANGE UP (ref 150–400)
POTASSIUM SERPL-MCNC: 3.7 MMOL/L — SIGNIFICANT CHANGE UP (ref 3.5–5.3)
POTASSIUM SERPL-SCNC: 3.7 MMOL/L — SIGNIFICANT CHANGE UP (ref 3.5–5.3)
PROT SERPL-MCNC: 6.3 G/DL — SIGNIFICANT CHANGE UP (ref 6–8.3)
RBC # BLD: 4.29 M/UL — SIGNIFICANT CHANGE UP (ref 3.8–5.2)
RBC # FLD: 13.8 % — SIGNIFICANT CHANGE UP (ref 10.3–14.5)
SODIUM SERPL-SCNC: 138 MMOL/L — SIGNIFICANT CHANGE UP (ref 135–145)
WBC # BLD: 12.44 K/UL — HIGH (ref 3.8–10.5)
WBC # FLD AUTO: 12.44 K/UL — HIGH (ref 3.8–10.5)

## 2020-06-09 PROCEDURE — 99232 SBSQ HOSP IP/OBS MODERATE 35: CPT | Mod: GC

## 2020-06-09 RX ORDER — POTASSIUM CHLORIDE 20 MEQ
20 PACKET (EA) ORAL ONCE
Refills: 0 | Status: COMPLETED | OUTPATIENT
Start: 2020-06-09 | End: 2020-06-09

## 2020-06-09 RX ORDER — SODIUM CHLORIDE 9 MG/ML
1000 INJECTION, SOLUTION INTRAVENOUS
Refills: 0 | Status: DISCONTINUED | OUTPATIENT
Start: 2020-06-09 | End: 2020-06-10

## 2020-06-09 RX ADMIN — ESCITALOPRAM OXALATE 10 MILLIGRAM(S): 10 TABLET, FILM COATED ORAL at 14:00

## 2020-06-09 RX ADMIN — Medication 650 MILLIGRAM(S): at 14:00

## 2020-06-09 RX ADMIN — Medication 5 MILLIGRAM(S): at 05:18

## 2020-06-09 RX ADMIN — Medication 400 MILLIGRAM(S): at 00:36

## 2020-06-09 RX ADMIN — Medication 20 MILLIEQUIVALENT(S): at 14:03

## 2020-06-09 RX ADMIN — Medication 5 MILLIGRAM(S): at 17:58

## 2020-06-09 RX ADMIN — DONEPEZIL HYDROCHLORIDE 10 MILLIGRAM(S): 10 TABLET, FILM COATED ORAL at 21:17

## 2020-06-09 RX ADMIN — ENOXAPARIN SODIUM 40 MILLIGRAM(S): 100 INJECTION SUBCUTANEOUS at 14:01

## 2020-06-09 RX ADMIN — Medication 90 MILLIGRAM(S): at 06:11

## 2020-06-09 RX ADMIN — Medication 400 MILLIGRAM(S): at 14:00

## 2020-06-09 RX ADMIN — Medication 650 MILLIGRAM(S): at 00:35

## 2020-06-09 RX ADMIN — Medication 400 MILLIGRAM(S): at 20:23

## 2020-06-09 RX ADMIN — Medication 650 MILLIGRAM(S): at 05:17

## 2020-06-09 RX ADMIN — Medication 400 MILLIGRAM(S): at 05:17

## 2020-06-09 NOTE — PROGRESS NOTE ADULT - ASSESSMENT
RENE CHILEL is a 71yo female w/ mild dementia/imbalance, cholelithiasis, recent choledocholithiasis (s/p 5 mm stone removal via ERCP 5/22/2020 by Dr. Elizondo) now presenting with symptoms of biliary colic. She is now s/p laparoscopic cholecystectomy without complications.     Pain Control: Adequate  Regular Diet As Tolerated   DVT Prophylaxis: Lovenox  Out of Bed and encourage early ambulation  Incentive Spirometry  Appreciate GI Consult: Planned ERCP 6/10/2020        Red Surgery [phone]

## 2020-06-09 NOTE — CHART NOTE - NSCHARTNOTEFT_GEN_A_CORE
Adv GI Brief Update    Spoke with patient's , Mr. Raines ([phone]) for approx 30 min re: her care.  He expressed frustration about not being updated about condition and plans for patient as patient has memory issues.  He expressed that he must be called with every decision and step in her care.  I apologized for any lack of communication.  Explained GI plan to him re: ERCP tomorrow (time TBD) for stone removal.   expressed understanding and requested that procedure be done ASAP to get his wife home.  He requested that he be called when the patient is being transported to the endoscopy unit and every step of the way.  I explained our team will call him for consent prior to initiating the procedure but would advise primary team to contact him otherwise.  He expressed understanding and thanked me.

## 2020-06-09 NOTE — PROGRESS NOTE ADULT - SUBJECTIVE AND OBJECTIVE BOX
Chief Complaint:  Patient is a 70y old  Female who presents with a chief complaint of Abdominal pain (2020 01:17)      Interval Events: No adverse events overnight.  MRCP does show distal stones.     Allergies:  penicillin (Rash)      Hospital Medications:  acetaminophen   Tablet .. 650 milliGRAM(s) Oral every 6 hours  diltiazem    Tablet 90 milliGRAM(s) Oral daily  donepezil 10 milliGRAM(s) Oral at bedtime  enoxaparin Injectable 40 milliGRAM(s) SubCutaneous daily  escitalopram 10 milliGRAM(s) Oral daily  ibuprofen  Tablet. 400 milliGRAM(s) Oral every 6 hours  oxybutynin 5 milliGRAM(s) Oral two times a day  potassium chloride    Tablet ER 20 milliEquivalent(s) Oral once      PMHX/PSHX:  Memory changes  Balance disorder  Bladder incontinence  HTN (hypertension)  No significant past surgical history      Family history:  No pertinent family history in first degree relatives      ROS:     General:  No wt loss, fevers, chills, night sweats, fatigue,   Eyes:  Good vision, no reported pain  ENT:  No sore throat, pain, runny nose, dysphagia  CV:  No pain, palpitations, hypo/hypertension  Resp:  No dyspnea, cough, tachypnea, wheezing  GI:  See HPI  :  No pain, bleeding, incontinence, nocturia  Muscle:  No pain, weakness  Neuro:  No weakness, tingling, memory problems  Psych:  No fatigue, insomnia, mood problems, depression  Endocrine:  No polyuria, polydipsia, cold/heat intolerance  Heme:  No petechiae, ecchymosis, easy bruisability  Skin:  No rash, edema      PHYSICAL EXAM:     GENERAL: NAD  HEENT:  NC/AT  CHEST:  Full & symmetric excursion, no increased effort  ABDOMEN:  Soft, non-tender, non-distended, +BS  EXTREMITIES:  no edema  SKIN:  No rash  NEURO:  Alert    Vital Signs:  Vital Signs Last 24 Hrs  T(C): 36.7 (2020 05:13), Max: 36.8 (2020 20:58)  T(F): 98.1 (2020 05:13), Max: 98.2 (2020 20:58)  HR: 88 (2020 05:13) (75 - 99)  BP: 123/80 (2020 05:13) (123/80 - 170/88)  BP(mean): 111 (2020 16:15) (99 - 121)  RR: 18 (2020 05:13) (14 - 18)  SpO2: 95% (2020 05:13) (95% - 100%)  Daily Height in cm: 154.94 (2020 13:26)    Daily     LABS:                        13.1   12.44 )-----------( 288      ( 2020 06:49 )             40.0     < from: MR MRCP No Cont (20 @ 10:42) >    EXAM:  MR MRCP                            PROCEDURE DATE:  2020            INTERPRETATION:  CLINICAL INFORMATION: Abdominal pain. Located gallstones.    COMPARISON: CT scan     PROCEDURE:   MRI/MRCP was performed without intravenous contrast. Radial and 3D MRCP sequences were obtained.   IV Contrast:  0 cc administered, 0 cc discarded.    FINDINGS:  LOWER CHEST: Within normal limits.    LIVER: Within normal limits.   BILE DUCTS: Mild intrahepatic biliary ductal dilatation. CBD is mildly dilated, measuring 9 mm. 3 mm dependent stone in the distal CBD (6:23).  GALLBLADDER: Innumerable small stones in the gallbladder. Gallbladder wall thickening.  SPLEEN: Within normal limits.  PANCREAS: Within normal limits.  ADRENALS: Within normal limits.  KIDNEYS/URETERS: Hemorrhagic and nonhemorrhagic renal cysts. No hydronephrosis.    VISUALIZED PORTIONS:  BOWEL: Within normal limits.   PERITONEUM: No ascites.  VESSELS: Within normal limits.  RETROPERITONEUM/LYMPH NODES: No lymphadenopathy.    ABDOMINAL WALL: Within normal limits.  BONES: Within normal limits.    IMPRESSION:     Cholelithiasis and choledocholithiasis with 3 mm stone in the distal CBD.    Mild intrahepatic and extra hepatic biliary ductal dilatation.                      MARINA MILLER M.D., ATTENDING RADIOLOGIST  This document has been electronically signed. 2020 12:52PM                < end of copied text >      138  |  101  |  11  ----------------------------<  154<H>  3.7   |  24  |  0.71    Ca    9.4      2020 06:49  Phos  2.9     06-  Mg     2.1     06-09    TPro  6.3  /  Alb  4.2  /  TBili  0.6  /  DBili  x   /  AST  38  /  ALT  37  /  AlkPhos  64  06-09    LIVER FUNCTIONS - ( 2020 06:49 )  Alb: 4.2 g/dL / Pro: 6.3 g/dL / ALK PHOS: 64 U/L / ALT: 37 U/L / AST: 38 U/L / GGT: x           PT/INR - ( 2020 07:38 )   PT: 11.3 sec;   INR: 0.99 ratio         PTT - ( 2020 07:38 )  PTT:24.3 sec  Urinalysis Basic - ( 2020 09:57 )    Color: Colorless / Appearance: Clear / S.038 / pH: x  Gluc: x / Ketone: Negative  / Bili: Negative / Urobili: Negative   Blood: x / Protein: Negative / Nitrite: Negative   Leuk Esterase: Negative / RBC: 1 /hpf / WBC 2 /HPF   Sq Epi: x / Non Sq Epi: 3 /hpf / Bacteria: Negative          Imaging:

## 2020-06-09 NOTE — PROGRESS NOTE ADULT - SUBJECTIVE AND OBJECTIVE BOX
Red Team Surgery Progress Note     SUBJECTIVE / 24H EVENTS  Patient seen and examined on morning rounds. No acute events overnight. She has tolerated regular diet, voided appropriately. She inquires about discharge but exhibits understanding of need to monitor am LFTs. She denies fevers, chills, nausea, or vomiting.     OBJECTIVE:    VITAL SIGNS:  T(C): 36.7 (20 @ 00:34), Max: 36.8 (20 @ 20:58)  HR: 85 (20 @ 00:34) (69 - 99)  BP: 159/77 (20 @ 00:34) (126/84 - 170/88)  RR: 18 (20 @ 00:34) (14 - 18)  SpO2: 95% (20 @ 00:34) (95% - 100%)  Daily Height in cm: 154.94 (2020 13:26)        PHYSICAL EXAM:  Gen: NAD  LS: Respirations unlabored. CTA b/l  Card: RRR. No m/r/g.   GI: four lap incisions with minimal strikethrough, NT, ND.  Ext: Warm, well perfused        20 @ 07:01  -  20 @ 07:00  --------------------------------------------------------  IN:  Total IN: 0 mL    OUT:  Total OUT: 0 mL    Total NET: 0 mL      20 @ 07:01  -  20 @ 01:17  --------------------------------------------------------  IN:    Oral Fluid: 120 mL  Total IN: 120 mL    OUT:    Voided: 400 mL  Total OUT: 400 mL    Total NET: -280 mL            LAB VALUES:      143  |  105  |  12  ----------------------------<  101<H>  3.4<L>   |  26  |  0.75    Ca    9.4      2020 06:36  Phos  2.8     06-08  Mg     2.3     06-08    TPro  6.0  /  Alb  4.1  /  TBili  0.8  /  DBili  x   /  AST  12  /  ALT  10  /  AlkPhos  64  06-08                               13.6   6.42  )-----------( 280      ( 2020 06:37 )             42.2     LIVER FUNCTIONS - ( 2020 06:36 )  Alb: 4.1 g/dL / Pro: 6.0 g/dL / ALK PHOS: 64 U/L / ALT: 10 U/L / AST: 12 U/L / GGT: x           PT/INR - ( 2020 07:38 )   PT: 11.3 sec;   INR: 0.99 ratio         PTT - ( 2020 07:38 )  PTT:24.3 sec        Urinalysis Basic - ( 2020 09:57 )    Color: Colorless / Appearance: Clear / S.038 / pH: x  Gluc: x / Ketone: Negative  / Bili: Negative / Urobili: Negative   Blood: x / Protein: Negative / Nitrite: Negative   Leuk Esterase: Negative / RBC: 1 /hpf / WBC 2 /HPF   Sq Epi: x / Non Sq Epi: 3 /hpf / Bacteria: Negative        MICROBIOLOGY:    No new microbiology data for review.     RADIOLOGY:  PACS Image: Image(s) Available (20 @ 10:42)    No new radiographic images for review.    MEDICATIONS  (STANDING):  acetaminophen   Tablet .. 650 milliGRAM(s) Oral every 6 hours  diltiazem    Tablet 90 milliGRAM(s) Oral daily  donepezil 10 milliGRAM(s) Oral at bedtime  enoxaparin Injectable 40 milliGRAM(s) SubCutaneous daily  escitalopram 10 milliGRAM(s) Oral daily  ibuprofen  Tablet. 400 milliGRAM(s) Oral every 6 hours  oxybutynin 5 milliGRAM(s) Oral two times a day

## 2020-06-09 NOTE — PROGRESS NOTE ADULT - ASSESSMENT
Impression:    70 y/o F w/ hx of HTN, dementia, cholelithiasis, and choledocholithiasis s/p ERCP on 5/22/20 with biliary sphincterotomy and balloon sweeps with removal of bile duct stone, presenting with abdominal pain, found to have dilated CBD on CT A/P.    # Abdominal pain secondary to cbd stones  # Cholelithiasis and CBD stones on MRCP, no obstructive labs   # Dementia  # COVID-19 testing: Negative on 6/7/20    Recommendations:  - tentatively planning for ERCP tomorrow for stone removal  - Trend CBC, CMP, INR  - replete lytes   - NPO after midnight  - antibiotics if develops fever

## 2020-06-09 NOTE — CHART NOTE - NSCHARTNOTEFT_GEN_A_CORE
spoke with patient's  again at length regarding patient's plan- ERCP tomorrow    Saray Feliz PA-C [phone]

## 2020-06-10 ENCOUNTER — TRANSCRIPTION ENCOUNTER (OUTPATIENT)
Age: 70
End: 2020-06-10

## 2020-06-10 VITALS
SYSTOLIC BLOOD PRESSURE: 145 MMHG | TEMPERATURE: 99 F | DIASTOLIC BLOOD PRESSURE: 79 MMHG | OXYGEN SATURATION: 95 % | HEART RATE: 75 BPM | RESPIRATION RATE: 18 BRPM

## 2020-06-10 LAB
ALBUMIN SERPL ELPH-MCNC: 4.2 G/DL — SIGNIFICANT CHANGE UP (ref 3.3–5)
ALP SERPL-CCNC: 61 U/L — SIGNIFICANT CHANGE UP (ref 40–120)
ALT FLD-CCNC: 28 U/L — SIGNIFICANT CHANGE UP (ref 10–45)
ANION GAP SERPL CALC-SCNC: 11 MMOL/L — SIGNIFICANT CHANGE UP (ref 5–17)
APTT BLD: 27.4 SEC — LOW (ref 27.5–36.3)
AST SERPL-CCNC: 23 U/L — SIGNIFICANT CHANGE UP (ref 10–40)
BILIRUB SERPL-MCNC: 0.7 MG/DL — SIGNIFICANT CHANGE UP (ref 0.2–1.2)
BUN SERPL-MCNC: 15 MG/DL — SIGNIFICANT CHANGE UP (ref 7–23)
CALCIUM SERPL-MCNC: 9.6 MG/DL — SIGNIFICANT CHANGE UP (ref 8.4–10.5)
CHLORIDE SERPL-SCNC: 105 MMOL/L — SIGNIFICANT CHANGE UP (ref 96–108)
CO2 SERPL-SCNC: 26 MMOL/L — SIGNIFICANT CHANGE UP (ref 22–31)
CREAT SERPL-MCNC: 0.9 MG/DL — SIGNIFICANT CHANGE UP (ref 0.5–1.3)
GLUCOSE SERPL-MCNC: 105 MG/DL — HIGH (ref 70–99)
HCT VFR BLD CALC: 42.1 % — SIGNIFICANT CHANGE UP (ref 34.5–45)
HGB BLD-MCNC: 13.6 G/DL — SIGNIFICANT CHANGE UP (ref 11.5–15.5)
INR BLD: 0.94 RATIO — SIGNIFICANT CHANGE UP (ref 0.88–1.16)
MAGNESIUM SERPL-MCNC: 2.3 MG/DL — SIGNIFICANT CHANGE UP (ref 1.6–2.6)
MCHC RBC-ENTMCNC: 30.2 PG — SIGNIFICANT CHANGE UP (ref 27–34)
MCHC RBC-ENTMCNC: 32.3 GM/DL — SIGNIFICANT CHANGE UP (ref 32–36)
MCV RBC AUTO: 93.6 FL — SIGNIFICANT CHANGE UP (ref 80–100)
NRBC # BLD: 0 /100 WBCS — SIGNIFICANT CHANGE UP (ref 0–0)
PHOSPHATE SERPL-MCNC: 3 MG/DL — SIGNIFICANT CHANGE UP (ref 2.5–4.5)
PLATELET # BLD AUTO: 289 K/UL — SIGNIFICANT CHANGE UP (ref 150–400)
POTASSIUM SERPL-MCNC: 4.3 MMOL/L — SIGNIFICANT CHANGE UP (ref 3.5–5.3)
POTASSIUM SERPL-SCNC: 4.3 MMOL/L — SIGNIFICANT CHANGE UP (ref 3.5–5.3)
PROT SERPL-MCNC: 6.4 G/DL — SIGNIFICANT CHANGE UP (ref 6–8.3)
PROTHROM AB SERPL-ACNC: 10.7 SEC — SIGNIFICANT CHANGE UP (ref 10–13.1)
RBC # BLD: 4.5 M/UL — SIGNIFICANT CHANGE UP (ref 3.8–5.2)
RBC # FLD: 14.3 % — SIGNIFICANT CHANGE UP (ref 10.3–14.5)
SARS-COV-2 RNA SPEC QL NAA+PROBE: SIGNIFICANT CHANGE UP
SODIUM SERPL-SCNC: 142 MMOL/L — SIGNIFICANT CHANGE UP (ref 135–145)
WBC # BLD: 11.94 K/UL — HIGH (ref 3.8–10.5)
WBC # FLD AUTO: 11.94 K/UL — HIGH (ref 3.8–10.5)

## 2020-06-10 PROCEDURE — C1889: CPT

## 2020-06-10 PROCEDURE — 82947 ASSAY GLUCOSE BLOOD QUANT: CPT

## 2020-06-10 PROCEDURE — 86850 RBC ANTIBODY SCREEN: CPT

## 2020-06-10 PROCEDURE — 85610 PROTHROMBIN TIME: CPT

## 2020-06-10 PROCEDURE — 80053 COMPREHEN METABOLIC PANEL: CPT

## 2020-06-10 PROCEDURE — 88304 TISSUE EXAM BY PATHOLOGIST: CPT

## 2020-06-10 PROCEDURE — 76705 ECHO EXAM OF ABDOMEN: CPT

## 2020-06-10 PROCEDURE — C1769: CPT

## 2020-06-10 PROCEDURE — 87086 URINE CULTURE/COLONY COUNT: CPT

## 2020-06-10 PROCEDURE — 43264 ERCP REMOVE DUCT CALCULI: CPT | Mod: GC

## 2020-06-10 PROCEDURE — 82803 BLOOD GASES ANY COMBINATION: CPT

## 2020-06-10 PROCEDURE — 81001 URINALYSIS AUTO W/SCOPE: CPT

## 2020-06-10 PROCEDURE — 85014 HEMATOCRIT: CPT

## 2020-06-10 PROCEDURE — 85730 THROMBOPLASTIN TIME PARTIAL: CPT

## 2020-06-10 PROCEDURE — U0003: CPT

## 2020-06-10 PROCEDURE — 82330 ASSAY OF CALCIUM: CPT

## 2020-06-10 PROCEDURE — 74177 CT ABD & PELVIS W/CONTRAST: CPT

## 2020-06-10 PROCEDURE — 83605 ASSAY OF LACTIC ACID: CPT

## 2020-06-10 PROCEDURE — 83735 ASSAY OF MAGNESIUM: CPT

## 2020-06-10 PROCEDURE — 74181 MRI ABDOMEN W/O CONTRAST: CPT

## 2020-06-10 PROCEDURE — 86803 HEPATITIS C AB TEST: CPT

## 2020-06-10 PROCEDURE — 82435 ASSAY OF BLOOD CHLORIDE: CPT

## 2020-06-10 PROCEDURE — 86900 BLOOD TYPING SEROLOGIC ABO: CPT

## 2020-06-10 PROCEDURE — 84100 ASSAY OF PHOSPHORUS: CPT

## 2020-06-10 PROCEDURE — 74330 X-RAY BILE/PANC ENDOSCOPY: CPT

## 2020-06-10 PROCEDURE — 84132 ASSAY OF SERUM POTASSIUM: CPT

## 2020-06-10 PROCEDURE — 71045 X-RAY EXAM CHEST 1 VIEW: CPT

## 2020-06-10 PROCEDURE — 84295 ASSAY OF SERUM SODIUM: CPT

## 2020-06-10 PROCEDURE — 85027 COMPLETE CBC AUTOMATED: CPT

## 2020-06-10 PROCEDURE — 43262 ENDO CHOLANGIOPANCREATOGRAPH: CPT | Mod: GC,59

## 2020-06-10 PROCEDURE — 86901 BLOOD TYPING SEROLOGIC RH(D): CPT

## 2020-06-10 PROCEDURE — 99285 EMERGENCY DEPT VISIT HI MDM: CPT

## 2020-06-10 PROCEDURE — 83690 ASSAY OF LIPASE: CPT

## 2020-06-10 PROCEDURE — 84484 ASSAY OF TROPONIN QUANT: CPT

## 2020-06-10 PROCEDURE — 93005 ELECTROCARDIOGRAM TRACING: CPT

## 2020-06-10 RX ORDER — ASPIRIN/CALCIUM CARB/MAGNESIUM 324 MG
0 TABLET ORAL
Qty: 0 | Refills: 0 | DISCHARGE

## 2020-06-10 RX ADMIN — SODIUM CHLORIDE 75 MILLILITER(S): 9 INJECTION, SOLUTION INTRAVENOUS at 00:00

## 2020-06-10 RX ADMIN — Medication 650 MILLIGRAM(S): at 12:03

## 2020-06-10 RX ADMIN — Medication 90 MILLIGRAM(S): at 06:18

## 2020-06-10 RX ADMIN — ESCITALOPRAM OXALATE 10 MILLIGRAM(S): 10 TABLET, FILM COATED ORAL at 12:04

## 2020-06-10 RX ADMIN — Medication 5 MILLIGRAM(S): at 06:18

## 2020-06-10 RX ADMIN — Medication 650 MILLIGRAM(S): at 00:51

## 2020-06-10 RX ADMIN — ENOXAPARIN SODIUM 40 MILLIGRAM(S): 100 INJECTION SUBCUTANEOUS at 12:03

## 2020-06-10 RX ADMIN — Medication 650 MILLIGRAM(S): at 06:17

## 2020-06-10 NOTE — CHART NOTE - NSCHARTNOTEFT_GEN_A_CORE
Surgery Post op Note    Procedure: ERCP   Date: 6/10/2020  Results: Choledocholithiasis in a mildly dilated common bile duct, complete removal accomplished by extension of biliary sphincterotomy, balloon sweeps.    SUBJECTIVE: Patient seen and evaluated at the bedside after ERCP. Patient feels great with no complaints. Wants to go home. Tolerating clears.  at bedside.   SOB:  [ ] YES [ x] NO  Chest Discomfort: [ ] YES [x ] NO    Nausea: [ ] YES [x ] NO           Vomiting: [ ] YES [x ] NO  Abdominal Pain: [x ]YES [ ]No         Pain Control Adequate: [x ] YES [ ] NO    Objective:   Vital Signs Last 24 Hrs  T(C): 36.8 (10 Leoncio 2020 11:57), Max: 37.4 (09 Jun 2020 17:09)  T(F): 98.3 (10 Leoncio 2020 11:57), Max: 99.3 (09 Jun 2020 17:09)  HR: 66 (10 Leoncio 2020 11:57) (65 - 81)  BP: 154/85 (10 Leoncio 2020 11:57) (130/78 - 162/82)  BP(mean): --  RR: 18 (10 Leoncio 2020 11:57) (18 - 18)  SpO2: 96% (10 Leoncio 2020 11:57) (95% - 96%)  I&O's Summary    09 Jun 2020 07:01  -  10 Leoncio 2020 07:00  --------------------------------------------------------  IN: 1190 mL / OUT: 550 mL / NET: 640 mL    10 Leoncio 2020 07:01  -  10 Leoncio 2020 14:53  --------------------------------------------------------  IN: 0 mL / OUT: 600 mL / NET: -600 mL      I&O's Detail    09 Jun 2020 07:01  -  10 Leoncio 2020 07:00  --------------------------------------------------------  IN:    lactated ringers.: 150 mL    Oral Fluid: 1040 mL  Total IN: 1190 mL    OUT:    Voided: 550 mL  Total OUT: 550 mL    Total NET: 640 mL      10 Leoncio 2020 07:01  -  10 Leoncio 2020 14:53  --------------------------------------------------------  IN:  Total IN: 0 mL    OUT:    Voided: 600 mL  Total OUT: 600 mL    Total NET: -600 mL            LABS:                        13.6   11.94 )-----------( 289      ( 10 Leoncio 2020 07:09 )             42.1     06-10    142  |  105  |  15  ----------------------------<  105<H>  4.3   |  26  |  0.90    Ca    9.6      10 Leoncio 2020 07:09  Phos  3.0     06-10  Mg     2.3     06-10    TPro  6.4  /  Alb  4.2  /  TBili  0.7  /  DBili  x   /  AST  23  /  ALT  28  /  AlkPhos  61  06-10    PT/INR - ( 10 Leoncio 2020 08:09 )   PT: 10.7 sec;   INR: 0.94 ratio         PTT - ( 10 Leoncio 2020 08:09 )  PTT:27.4 sec      MEDICATIONS  (STANDING):  acetaminophen   Tablet .. 650 milliGRAM(s) Oral every 6 hours  diltiazem    Tablet 90 milliGRAM(s) Oral daily  donepezil 10 milliGRAM(s) Oral at bedtime  enoxaparin Injectable 40 milliGRAM(s) SubCutaneous daily  escitalopram 10 milliGRAM(s) Oral daily  ibuprofen  Tablet. 400 milliGRAM(s) Oral every 6 hours  lactated ringers. 1000 milliLiter(s) (75 mL/Hr) IV Continuous <Continuous>  oxybutynin 5 milliGRAM(s) Oral two times a day    MEDICATIONS  (PRN):      Physical exam  General: NAD, resting comfortably in bed  Neuro: alert and oriented, responding appropriately to questions  Respiratory: non labored breathing on room air  Abdomen: soft, nontender, nondistended, steri strips in place     Assessment/Plan:   RENE CHILEL is a 71yo female w/ mild dementia/imbalance, cholelithiasis, recent choledocholithiasis (s/p 5 mm stone removal via ERCP 5/22/2020 by Dr. Elizondo) now presenting with symptoms of biliary colic. She is now s/p laparoscopic cholecystectomy without complications. Now s/p ERCP.     - Diet: clear liquid, advance as tolerated   - Activity- OOB with assistance  - Pain medication as needed   - DVT ppx  - incentive spirometry   - possible D/C after dinner halie Santiago PA-C   p [phone]

## 2020-06-10 NOTE — PROGRESS NOTE ADULT - SUBJECTIVE AND OBJECTIVE BOX
Pre-Endoscopy Evaluation      Referring Physician:  dr. ashli gonsalez                                    Procedure: ercp    Indication for Procedure: dilated cbd    Pertinent History: 70 y/o F w/ hx of HTN, dementia, cholelithiasis, and choledocholithiasis s/p ERCP on 5/22/20 with biliary sphincterotomy and balloon sweeps with removal of bile duct stone, presenting with abdominal pain, found to have dilated CBD on CT A/P      PAST MEDICAL & SURGICAL HISTORY:  Memory changes  Balance disorder  Bladder incontinence  HTN (hypertension)  No significant past surgical history      PMH of Gastroparesis [ ]  Gastric Surgery [ ]  Gastric Outlet Obstruction [ ]    Allergies    penicillin (Rash)    Intolerances    Latex allergy: [ ] yes [X] no    Medications:MEDICATIONS  (STANDING):  acetaminophen   Tablet .. 650 milliGRAM(s) Oral every 6 hours  diltiazem    Tablet 90 milliGRAM(s) Oral daily  donepezil 10 milliGRAM(s) Oral at bedtime  enoxaparin Injectable 40 milliGRAM(s) SubCutaneous daily  escitalopram 10 milliGRAM(s) Oral daily  ibuprofen  Tablet. 400 milliGRAM(s) Oral every 6 hours  lactated ringers. 1000 milliLiter(s) (75 mL/Hr) IV Continuous <Continuous>  oxybutynin 5 milliGRAM(s) Oral two times a day    MEDICATIONS  (PRN):      Smoking: [ ] yes  [X] no    AICD/PPM: [ ] yes   [X] no    Pertinent lab data:                        13.6   11.94 )-----------( 289      ( 10 Leoncio 2020 07:09 )             42.1     06-10    142  |  105  |  15  ----------------------------<  105<H>  4.3   |  26  |  0.90    Ca    9.6      10 Leoncio 2020 07:09  Phos  3.0     06-10  Mg     2.3     06-10    TPro  6.4  /  Alb  4.2  /  TBili  0.7  /  DBili  x   /  AST  23  /  ALT  28  /  AlkPhos  61  06-10    COVID-19 PCR: NotDetec (09 Jun 2020 15:17)      Physical Examination:       Daily   Vital Signs Last 24 Hrs  T(C): 36.8 (10 Leoncio 2020 06:39), Max: 37.4 (09 Jun 2020 17:09)  T(F): 98.3 (10 Leoncio 2020 06:39), Max: 99.3 (09 Jun 2020 17:09)  HR: 65 (10 Leoncio 2020 06:39) (65 - 81)  BP: 162/82 (10 Leoncio 2020 06:39) (115/74 - 162/82)  BP(mean): --  RR: 18 (10 Leoncio 2020 06:39) (18 - 18)  SpO2: 96% (10 Leoncio 2020 06:39) (95% - 97%)    Drug Dosing Weight  Height (cm): 154.94 (08 Jun 2020 13:26)  Weight (kg): 51.7 (08 Jun 2020 13:26)  BMI (kg/m2): 21.5 (08 Jun 2020 13:26)  BSA (m2): 1.49 (08 Jun 2020 13:26)      Constitutional: NAD      Neck:  No JVD    Respiratory: CTAB/L    Cardiovascular: S1 and S2    Gastrointestinal: BS+, soft, NT/ND    Extremities: No peripheral edema    : No Maria    Skin: No rashes    Comments:      The patient is a suitable candidate for the planned procedure unless box checked [ ]  No, explain:

## 2020-06-10 NOTE — PROGRESS NOTE ADULT - ASSESSMENT
RENE CHILEL is a 69yo female w/ mild dementia/imbalance, cholelithiasis, recent choledocholithiasis (s/p 5 mm stone removal via ERCP 5/22/2020 by Dr. Elizondo) now presenting with symptoms of biliary colic. She is now s/p laparoscopic cholecystectomy without complications.     Plan:  - ERCP with GI today  - DC home after ERCP  - Pain Control: Adequate  - NPO for procedure today  - DVT Prophylaxis: Lovenox  - Out of Bed and encourage early ambulation  - Incentive Spirometry    [phone]

## 2020-06-10 NOTE — PROGRESS NOTE ADULT - SUBJECTIVE AND OBJECTIVE BOX
SURGERY DAILY PROGRESS NOTE:    Subjective:  Patient seen and examined. Reports pain is well controlled. Denies N/V. Tolerating diet. Passing flatus, +BM. Ambulating.    Vital Signs Last 24 Hrs  T(C): 36.8 (10 Leoncio 2020 06:39), Max: 37.4 (09 Jun 2020 17:09)  T(F): 98.3 (10 Leoncio 2020 06:39), Max: 99.3 (09 Jun 2020 17:09)  HR: 65 (10 Leoncio 2020 06:39) (65 - 81)  BP: 162/82 (10 Leoncio 2020 06:39) (115/74 - 162/82)  BP(mean): --  RR: 18 (10 Leoncio 2020 06:39) (18 - 18)  SpO2: 96% (10 Leoncio 2020 06:39) (95% - 97%)    Exam:  Gen: NAD, resting in bed, alert and responding appropriately  Resp: Airway patent, non-labored respirations  Abd: Soft, ND, NT, no rebound or guarding. Incisions dressings c/d/i  Ext: No edema, WWP  Neuro: AAOx3, no focal deficits    I&O's Detail    09 Jun 2020 07:01  -  10 Leoncio 2020 07:00  --------------------------------------------------------  IN:    lactated ringers.: 150 mL    Oral Fluid: 1040 mL  Total IN: 1190 mL    OUT:    Voided: 550 mL  Total OUT: 550 mL    Total NET: 640 mL          Daily     Daily     MEDICATIONS  (STANDING):  acetaminophen   Tablet .. 650 milliGRAM(s) Oral every 6 hours  diltiazem    Tablet 90 milliGRAM(s) Oral daily  donepezil 10 milliGRAM(s) Oral at bedtime  enoxaparin Injectable 40 milliGRAM(s) SubCutaneous daily  escitalopram 10 milliGRAM(s) Oral daily  ibuprofen  Tablet. 400 milliGRAM(s) Oral every 6 hours  lactated ringers. 1000 milliLiter(s) (75 mL/Hr) IV Continuous <Continuous>  oxybutynin 5 milliGRAM(s) Oral two times a day    MEDICATIONS  (PRN):      LABS:                        13.6   11.94 )-----------( 289      ( 10 Leoncio 2020 07:09 )             42.1     06-09    138  |  101  |  11  ----------------------------<  154<H>  3.7   |  24  |  0.71    Ca    9.4      09 Jun 2020 06:49  Phos  2.9     06-09  Mg     2.1     06-09    TPro  6.3  /  Alb  4.2  /  TBili  0.6  /  DBili  x   /  AST  38  /  ALT  37  /  AlkPhos  64  06-09

## 2020-06-10 NOTE — DISCHARGE NOTE NURSING/CASE MANAGEMENT/SOCIAL WORK - PATIENT PORTAL LINK FT
You can access the FollowMyHealth Patient Portal offered by [facility] by registering at the following website: [email or web address]. By joining [employer]’s FollowMyHealth portal, you will also be able to view your health information using other applications (apps) compatible with our system.

## 2020-06-15 ENCOUNTER — APPOINTMENT (OUTPATIENT)
Dept: SURGERY | Facility: CLINIC | Age: 70
End: 2020-06-15
Payer: MEDICARE

## 2020-06-15 ENCOUNTER — APPOINTMENT (OUTPATIENT)
Dept: SURGERY | Facility: CLINIC | Age: 70
End: 2020-06-15

## 2020-06-15 VITALS
WEIGHT: 140 LBS | HEART RATE: 89 BPM | SYSTOLIC BLOOD PRESSURE: 125 MMHG | DIASTOLIC BLOOD PRESSURE: 78 MMHG | HEIGHT: 61 IN | TEMPERATURE: 98.2 F | BODY MASS INDEX: 26.43 KG/M2

## 2020-06-15 PROCEDURE — 99024 POSTOP FOLLOW-UP VISIT: CPT

## 2020-07-22 ENCOUNTER — OUTPATIENT (OUTPATIENT)
Dept: OUTPATIENT SERVICES | Facility: HOSPITAL | Age: 70
LOS: 1 days | End: 2020-07-22
Payer: SUBSIDIZED

## 2020-07-22 ENCOUNTER — RESULT REVIEW (OUTPATIENT)
Age: 70
End: 2020-07-22

## 2020-07-22 ENCOUNTER — APPOINTMENT (OUTPATIENT)
Dept: MRI IMAGING | Facility: HOSPITAL | Age: 70
End: 2020-07-22

## 2020-07-22 DIAGNOSIS — Z00.00 ENCOUNTER FOR GENERAL ADULT MEDICAL EXAMINATION WITHOUT ABNORMAL FINDINGS: ICD-10-CM

## 2020-07-22 DIAGNOSIS — Z00.6 ENCOUNTER FOR EXAMINATION FOR NORMAL COMPARISON AND CONTROL IN CLINICAL RESEARCH PROGRAM: ICD-10-CM

## 2020-07-22 PROCEDURE — 70551 MRI BRAIN STEM W/O DYE: CPT

## 2020-07-22 PROCEDURE — 70551 MRI BRAIN STEM W/O DYE: CPT | Mod: 26

## 2020-09-16 ENCOUNTER — RESULT REVIEW (OUTPATIENT)
Age: 70
End: 2020-09-16

## 2020-09-16 ENCOUNTER — OUTPATIENT (OUTPATIENT)
Dept: OUTPATIENT SERVICES | Facility: HOSPITAL | Age: 70
LOS: 1 days | End: 2020-09-16
Payer: SUBSIDIZED

## 2020-09-16 PROCEDURE — A9552: CPT

## 2020-09-16 PROCEDURE — 78608 BRAIN IMAGING (PET): CPT

## 2020-09-21 DIAGNOSIS — G30.9 ALZHEIMER'S DISEASE, UNSPECIFIED: ICD-10-CM

## 2020-10-15 ENCOUNTER — RESULT REVIEW (OUTPATIENT)
Age: 70
End: 2020-10-15

## 2020-10-15 ENCOUNTER — APPOINTMENT (OUTPATIENT)
Dept: MRI IMAGING | Facility: HOSPITAL | Age: 70
End: 2020-10-15

## 2020-10-15 ENCOUNTER — OUTPATIENT (OUTPATIENT)
Dept: OUTPATIENT SERVICES | Facility: HOSPITAL | Age: 70
LOS: 1 days | End: 2020-10-15
Payer: SUBSIDIZED

## 2020-10-15 DIAGNOSIS — Z00.00 ENCOUNTER FOR GENERAL ADULT MEDICAL EXAMINATION WITHOUT ABNORMAL FINDINGS: ICD-10-CM

## 2020-10-15 DIAGNOSIS — Z00.6 ENCOUNTER FOR EXAMINATION FOR NORMAL COMPARISON AND CONTROL IN CLINICAL RESEARCH PROGRAM: ICD-10-CM

## 2020-10-15 PROCEDURE — 70551 MRI BRAIN STEM W/O DYE: CPT

## 2020-10-15 PROCEDURE — 70551 MRI BRAIN STEM W/O DYE: CPT | Mod: 26

## 2020-11-09 ENCOUNTER — RX RENEWAL (OUTPATIENT)
Age: 70
End: 2020-11-09

## 2021-01-25 ENCOUNTER — NON-APPOINTMENT (OUTPATIENT)
Age: 71
End: 2021-01-25

## 2021-02-22 RX ORDER — MEMANTINE HYDROCHLORIDE AND DONEPEZIL HYDROCHLORIDE 7-10/14-10
7 & 14 & 21 &28 KIT ORAL
Qty: 1 | Refills: 1 | Status: DISCONTINUED | COMMUNITY
Start: 2021-01-20 | End: 2021-02-22

## 2021-02-22 RX ORDER — DONEPEZIL HYDROCHLORIDE 10 MG/1
10 TABLET, FILM COATED ORAL DAILY
Qty: 30 | Refills: 2 | Status: ACTIVE | COMMUNITY
Start: 2021-02-22

## 2021-03-09 ENCOUNTER — APPOINTMENT (OUTPATIENT)
Dept: NEUROLOGY | Facility: CLINIC | Age: 71
End: 2021-03-09
Payer: MEDICARE

## 2021-03-09 VITALS
HEART RATE: 84 BPM | BODY MASS INDEX: 26.43 KG/M2 | WEIGHT: 140 LBS | SYSTOLIC BLOOD PRESSURE: 118 MMHG | DIASTOLIC BLOOD PRESSURE: 79 MMHG | HEIGHT: 61 IN

## 2021-03-09 VITALS — TEMPERATURE: 97.3 F

## 2021-03-09 PROCEDURE — 99205 OFFICE O/P NEW HI 60 MIN: CPT

## 2021-03-09 RX ORDER — ASPIRIN 81 MG/1
81 TABLET, CHEWABLE ORAL
Refills: 3 | Status: ACTIVE | COMMUNITY
Start: 2021-03-09

## 2021-03-09 RX ORDER — SOLIFENACIN SUCCINATE 10 MG/1
10 TABLET, FILM COATED ORAL DAILY
Refills: 0 | Status: DISCONTINUED | COMMUNITY
Start: 2020-12-22 | End: 2021-03-09

## 2021-03-09 NOTE — PHYSICAL EXAM
[General Appearance - Alert] : alert [General Appearance - In No Acute Distress] : in no acute distress [Impaired Insight] : insight and judgment were intact [Affect] : the affect was normal [Person] : oriented to person [Concentration Intact] : normal concentrating ability [Naming Objects] : no difficulty naming common objects [Repeating Phrases] : no difficulty repeating a phrase [Fluency] : fluency intact [Comprehension] : comprehension intact [Past History] : adequate knowledge of personal past history [Cranial Nerves Optic (II)] : visual acuity intact bilaterally,  visual fields full to confrontation, pupils equal round and reactive to light [Cranial Nerves Oculomotor (III)] : extraocular motion intact [Cranial Nerves Trigeminal (V)] : facial sensation intact symmetrically [Cranial Nerves Facial (VII)] : face symmetrical [Cranial Nerves Vestibulocochlear (VIII)] : hearing was intact bilaterally [Cranial Nerves Glossopharyngeal (IX)] : tongue and palate midline [Cranial Nerves Accessory (XI - Cranial And Spinal)] : head turning and shoulder shrug symmetric [Cranial Nerves Hypoglossal (XII)] : there was no tongue deviation with protrusion [Motor Strength] : muscle strength was normal in all four extremities [No Muscle Atrophy] : normal bulk in all four extremities [Sensation Tactile Decrease] : light touch was intact [Balance] : balance was intact [2+] : Ankle jerk left 2+ [Remote Intact] : remote memory intact [Registration Intact] : recent registration memory intact [Motor Handedness Right-Handed] : the patient is right hand dominant [Sensation Vibration Decrease] : vibration was intact [3+] : Ankle jerk right 3+ [Sclera] : the sclera and conjunctiva were normal [PERRL With Normal Accommodation] : pupils were equal in size, round, reactive to light, with normal accommodation [Extraocular Movements] : extraocular movements were intact [Optic Disc Abnormality] : the optic disc were normal in size and color [No APD] : no afferent pupillary defect [No JUAN MANUEL] : no internuclear ophthalmoplegia [Outer Ear] : the ears and nose were normal in appearance [Oropharynx] : the oropharynx was normal [Neck Appearance] : the appearance of the neck was normal [Neck Cervical Mass (___cm)] : no neck mass was observed [Jugular Venous Distention Increased] : there was no jugular-venous distention [Thyroid Diffuse Enlargement] : the thyroid was not enlarged [Thyroid Nodule] : there were no palpable thyroid nodules [Auscultation Breath Sounds / Voice Sounds] : lungs were clear to auscultation bilaterally [Heart Rate And Rhythm] : heart rate was normal and rhythm regular [Heart Sounds] : normal S1 and S2 [Heart Sounds Gallop] : no gallops [Murmurs] : no murmurs [Heart Sounds Pericardial Friction Rub] : no pericardial rub [Arterial Pulses Carotid] : carotid pulses were normal with no bruits [Full Pulse] : the pedal pulses are present [Edema] : there was no peripheral edema [Bowel Sounds] : normal bowel sounds [Abdomen Soft] : soft [Abdomen Tenderness] : non-tender [Abdomen Mass (___ Cm)] : no abdominal mass palpated [No CVA Tenderness] : no ~M costovertebral angle tenderness [No Spinal Tenderness] : no spinal tenderness [Nail Clubbing] : no clubbing  or cyanosis of the fingernails [Musculoskeletal - Swelling] : no joint swelling seen [Motor Tone] : muscle strength and tone were normal [Skin Color & Pigmentation] : normal skin color and pigmentation [Skin Turgor] : normal skin turgor [] : no rash [Place] : disoriented to place [Time] : disoriented to time [Short Term Intact] : short term memory impaired [Span Intact] : the attention span was decreased [Visual Intact] : visual attention was ~T ~L decreased [Writing A Sentence] : difficulty writing a sentence [Reading] : difficulty reading [Current Events] : inadequate knowledge of current events [Vocabulary] : inadequate range of vocabulary [Motor Strength Upper Extremities Bilaterally] : strength was normal in both upper extremities [Motor Strength Lower Extremities Bilaterally] : strength was normal in both lower extremities [Romberg's Sign] : Romberg's sign was negtive [Dysesthesia] : no dysesthesia [Hyperesthesia] : no hyperesthesia [Past-pointing] : there was no past-pointing [Tremor] : no tremor present [Plantar Reflex Right Only] : normal on the right [Plantar Reflex Left Only] : normal on the left [FreeTextEntry4] : Exam limited.\par Understands and obeys simple commands\par Presidents: cannot name, says "the good one, the bad one"; knows about the virus, not the name\par Cannot read, cannot distinguish shapes; cannot write; no optic ataxia, but 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

## 2021-03-09 NOTE — REVIEW OF SYSTEMS
[As Noted in HPI] : as noted in HPI [Easy Bruising] : a tendency for easy bruising [Negative] : Endocrine [FreeTextEntry8] : Long-standing urinary frequency.

## 2021-03-09 NOTE — ASSESSMENT
[FreeTextEntry1] : Assessment:\par 69yo RH WW, with ongoing LOAD dementia, now moderate-advanced. 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 \par \par Diagnostic Impression:\par -LOAD\par \par Plan:\par -continue current Rx\par -try to be active\par -will continue with the trial and with the prescribed imaging (last MRI 10/2020, upcoming MRI soon).\par \par A thorough discussion was entertained with the patient/caregiver regarding the use of psychoactive medications, their possible benefits and AE profile, including the risk of cardiovascular complications, including but not limited to applicable black box warning and teratogenicity, where appropriate.\par We discussed the benefits of being active, physically and mentally, and the need to to establish a routine in this respect.\par Driving abilities and firearms possession and use were discussed, in relation to progression of the cognitive decline, and the need to assess them periodically.\par Patient/caregiver advised to bring previous records to this office.\par All questions were answered at the time of the visit. We are certainly available for further discussion as needed.\par Patient/caregiver fully understands and agree with the plan.\par

## 2021-03-09 NOTE — HISTORY OF PRESENT ILLNESS
[FreeTextEntry1] : [facility]:\par Per Dr. Bryant, 2016:\par 66 year-old right-handed woman who has had some cognitive complaints over approximately 1-1/2 years. She thinks that the symptoms have gradually progressed to a slight degree over this period of time. The first thing that they noticed was that she had difficulty learning how to use a new car. She continued to drive her old car for about 6 months thereafter, but then stopped driving because driving made her feel very anxious. She remains independent for all of her other usual activities of daily living, including using an iPad and a computer, cooking, household chores, and basic activities of daily living. Her  says that she has had mild short-term memory problems, such as misplacing objects. She says that she may go downstairs and forget why she had gone down there. When this occurs, it may come back to her later on. She does not repeat herself during conversations.\par She has a lifelong history of anxiety. She has been under the care of Dr. Olga Prieto for the past 3 years and has been taking escitalopram 10 mg daily. She also takes lorazepam 0.5 mg as needed, but rarely needs to do so. She denies depressed mood. She says that she has no problems with sleep. She had lost about 50 pounds over the preceding 5 years, but her weight has been relatively stable over the past year. There is no history of suicidal ideation. There have been no hallucinations, delusions, changes in personality, or aberrant behavior.\par She saw Dr. Travis Diaz on 05/12/2016, with a follow-up visit on 06/23/2016. Her workup included an MRI of brain on 05/26/2016 that was reportedly unremarkable, as well as normal sedimentation rate, vitamin B12, RPR, EMILY, Lyme, and TSH. He discussed the possibility of a PET scan with them, but this has not yet been done. He started her on donepezil 5 mg daily as of 06/23/2016, increased to 10 mg daily one month thereafter. She initially had some mild nausea from donepezil, which was mitigated by taking it in the morning with food. She has had no other adverse effects from donepezil. She thinks that donepezil may have been somewhat helpful, but says that this may also be "wishful thinking". She has never taken any other medications for her memory symptoms. Of note, she has been taking oxybutynin for urinary frequency over approximately the past 4 years. \par \par Current status:\par Pt not seen at 611 since 2016, since she was enrolled in clinical trials at [facility], and still in our [facility] clinical trial.\par Pt has tolerated the Rx well (on open label study arm).\par She has had a progression, which is in line to what expected for the disease. \par \par -Memory: STM\par -Speech: more circumstantial, difficulty finding words\par -Orientation: poor for time, knows her home and neighborhood and [facility]\par -Praxis: limited use of appliances, can manage her own tasks e.g. food and washing\par -Decision making/Executive fx/Multitasking:\par \par -Sleep: ok\par \par -Appetite: ok\par \par -Motor symptoms: slower gait, some issues with stairs, but doing well when holding banisters; tends to be hesitant and has some fear of falling\par \par -B/B: ok, doing well with VESIcare\par \par -Psychiatric symptoms: doing well\par \par -Functional status:\par ADL: can dress and wash, but needs prompting and reminders for most tasks\par IADL: poor\par CDR: 1.5\par \par -Professional status: former teacher\par \par PCP and other physicians:\par -PCP: GERSON BALTAZAR\par -Neuro: Manny\par \par Workup done: Reviewed on PACS.

## 2021-03-09 NOTE — DATA REVIEWED
[de-identified] : \par MRI brain without contrast report from 05/26/2016 reviewed: Essentially normal noncontrast brain MRI. [de-identified] : \par Blood test results from 03/29/2016 reviewed: Normal CBC, normal chemistry profile, cholesterol 244 mg/dL, LDL cholesterol 123 mg/dL, normal TSH, normal total T4, hemoglobin A1c 5.8%, ESR 5, vitamin B12 1674 pg/mL, normal folate, nonreactive RPR, negative EMILY, negative Lyme IgG and IgM serology.\par \par Carotid Doppler report from 06/20/2016 reviewed: No hemodynamically significant stenosis or occlusion of either internal carotid artery.\par \par Dr. Travis Diaz's consult note from 06/23/2016 reviewed.\par \par

## 2021-03-09 NOTE — DISCUSSION/SUMMARY
[FreeTextEntry1] : In summary, Ms. Raines has had gradually progressive cognitive decline over about 1-1/2 years, without significant impairment in her activities of daily living. She has mild deficits evident on exam, consistent with early mild cognitive impairment. She has noted some improvement on donepezil, and has had no adverse effects from this medication. Accordingly, she should continue donepezil 10 mg daily. I have recommended that she discontinue oxybutynin, which can have adverse cognitive effects, and switch to trospium 20 mg daily, an anticholinergic medication that does not cross the blood brain barrier, for symptomatic treatment of her urinary frequency. I spoke at length with the patient and her  regarding options for participating in research at [facility]. She is potentially interested in a clinical trial of a beta-secretase inhibitor. If she screens out for that study, she may be a candidate for the IDEAS amyloid imaging study, as well as a longitudinal observational study.  She is scheduled to see me for a follow-up visit on 03/30/2017. They can call in the interim as needed.

## 2021-03-25 ENCOUNTER — RESULT REVIEW (OUTPATIENT)
Age: 71
End: 2021-03-25

## 2021-03-25 ENCOUNTER — OUTPATIENT (OUTPATIENT)
Dept: OUTPATIENT SERVICES | Facility: HOSPITAL | Age: 71
LOS: 1 days | End: 2021-03-25
Payer: SUBSIDIZED

## 2021-03-25 ENCOUNTER — APPOINTMENT (OUTPATIENT)
Dept: MRI IMAGING | Facility: HOSPITAL | Age: 71
End: 2021-03-25

## 2021-03-25 DIAGNOSIS — Z00.00 ENCOUNTER FOR GENERAL ADULT MEDICAL EXAMINATION WITHOUT ABNORMAL FINDINGS: ICD-10-CM

## 2021-03-25 DIAGNOSIS — Z00.6 ENCOUNTER FOR EXAMINATION FOR NORMAL COMPARISON AND CONTROL IN CLINICAL RESEARCH PROGRAM: ICD-10-CM

## 2021-03-25 PROCEDURE — A9552: CPT

## 2021-03-25 PROCEDURE — 70551 MRI BRAIN STEM W/O DYE: CPT | Mod: 26

## 2021-03-25 PROCEDURE — 70551 MRI BRAIN STEM W/O DYE: CPT

## 2021-03-25 PROCEDURE — 78608 BRAIN IMAGING (PET): CPT

## 2021-04-14 ENCOUNTER — APPOINTMENT (OUTPATIENT)
Dept: GASTROENTEROLOGY | Facility: CLINIC | Age: 71
End: 2021-04-14
Payer: MEDICARE

## 2021-04-14 ENCOUNTER — LABORATORY RESULT (OUTPATIENT)
Age: 71
End: 2021-04-14

## 2021-04-14 VITALS
TEMPERATURE: 97.1 F | HEIGHT: 61 IN | DIASTOLIC BLOOD PRESSURE: 85 MMHG | HEART RATE: 89 BPM | WEIGHT: 135 LBS | BODY MASS INDEX: 25.49 KG/M2 | SYSTOLIC BLOOD PRESSURE: 123 MMHG

## 2021-04-14 DIAGNOSIS — Z86.010 PERSONAL HISTORY OF COLONIC POLYPS: ICD-10-CM

## 2021-04-14 DIAGNOSIS — R73.9 HYPERGLYCEMIA, UNSPECIFIED: ICD-10-CM

## 2021-04-14 DIAGNOSIS — R63.4 ABNORMAL WEIGHT LOSS: ICD-10-CM

## 2021-04-14 DIAGNOSIS — R10.9 UNSPECIFIED ABDOMINAL PAIN: ICD-10-CM

## 2021-04-14 DIAGNOSIS — I10 ESSENTIAL (PRIMARY) HYPERTENSION: ICD-10-CM

## 2021-04-14 PROCEDURE — 36415 COLL VENOUS BLD VENIPUNCTURE: CPT

## 2021-04-14 PROCEDURE — 82274 ASSAY TEST FOR BLOOD FECAL: CPT | Mod: QW

## 2021-04-14 PROCEDURE — 99214 OFFICE O/P EST MOD 30 MIN: CPT | Mod: 25

## 2021-04-14 RX ORDER — CALCIUM CITRATE/VITAMIN D3 315MG-6.25
315-6.25 TABLET ORAL
Refills: 0 | Status: ACTIVE | COMMUNITY

## 2021-04-14 RX ORDER — CYANOCOBALAMIN (VITAMIN B-12) 2500 MCG
5000 TABLET, SUBLINGUAL SUBLINGUAL
Refills: 0 | Status: ACTIVE | COMMUNITY

## 2021-04-14 RX ORDER — LACTOBACILLUS RHAMNOSUS GG 10B CELL
CAPSULE ORAL
Refills: 0 | Status: ACTIVE | COMMUNITY

## 2021-04-14 RX ORDER — SOLIFENACIN SUCCINATE 10 MG/1
10 TABLET ORAL DAILY
Refills: 0 | Status: DISCONTINUED | COMMUNITY
Start: 2020-12-22 | End: 2021-04-14

## 2021-04-14 NOTE — CONSULT LETTER
[Dear  ___] : Dear  [unfilled], [Courtesy Letter:] : I had the pleasure of seeing your patient, [unfilled], in my office today. [Please see my note below.] : Please see my note below. [Consult Closing:] : Thank you very much for allowing me to participate in the care of this patient.  If you have any questions, please do not hesitate to contact me. [Sincerely,] : Sincerely, [FreeTextEntry3] : Ld Mclaughlin M.D.\par

## 2021-04-14 NOTE — REVIEW OF SYSTEMS
[Abdominal Pain] : abdominal pain [Pelvic Pain] : pelvic pain [Confused] : confusion [Difficulty Walking] : difficulty walking [Muscle Weakness] : muscle weakness [Feelings Of Weakness] : feelings of weakness [Negative] : Heme/Lymph

## 2021-04-14 NOTE — ASSESSMENT
[FreeTextEntry1] : 1.  Vague abdominal pain, with anorexia, weight loss, listlessness--rule out aspirin-induced ulcer, neoplasm.  Some of her symptoms could be related to her medications and progressive dementia.  Is she anemic?  Could she have recurrent CBD stones with scarring of the sphincterotomy?\par 2.  History of biliary colic, status post cholecystectomy and ERCPs with stone extraction 2020.\par 3.  History of hyperplastic colonic polyps, last colonoscopy May 2013.\par 4.  Mildly overweight.\par 5.  Hypertension.\par 6.  Hypercholesterolemia.\par 7.  Adult onset dementia, on research protocol.\par 8.  History of anxiety.\par 9.  Urinary frequency.\par 10.  Penicillin allergy.\par \par Plan:\par 1.  Medical records reviewed.\par 2.  Extensive bloodwork drawn by me this afternoon.  Kj will call for test results tomorrow afternoon.\par 3.  Might consider empiric PPI for the possibility of aspirin-induced ulcer, pending clinical course.\par 4.  Might also consider CT scan abdomen/pelvis to assess for obvious intra-abdominal pathology.

## 2021-04-14 NOTE — PHYSICAL EXAM
[General Appearance - Alert] : alert [General Appearance - In No Acute Distress] : in no acute distress [General Appearance - Well Nourished] : well nourished [General Appearance - Well Developed] : well developed [Outer Ear] : the ears and nose were normal in appearance [Auscultation Breath Sounds / Voice Sounds] : lungs were clear to auscultation bilaterally [Heart Rate And Rhythm] : heart rate was normal and rhythm regular [Heart Sounds] : normal S1 and S2 [Heart Sounds Gallop] : no gallops [Murmurs] : no murmurs [Heart Sounds Pericardial Friction Rub] : no pericardial rub [Full Pulse] : the pedal pulses are present [Edema] : there was no peripheral edema [Bowel Sounds] : normal bowel sounds [Abdomen Soft] : soft [Abdomen Tenderness] : non-tender [Abdomen Mass (___ Cm)] : no abdominal mass palpated [Abdomen Hernia] : no hernia was discovered [Normal Sphincter Tone] : normal sphincter tone [No Rectal Mass] : no rectal mass [Internal Hemorrhoid] : internal hemorrhoids [External Hemorrhoid] : external hemorrhoids [Cervical Lymph Nodes Enlarged Posterior Bilaterally] : posterior cervical [Cervical Lymph Nodes Enlarged Anterior Bilaterally] : anterior cervical [Supraclavicular Lymph Nodes Enlarged Bilaterally] : supraclavicular [Inguinal Lymph Nodes Enlarged Bilaterally] : inguinal [Nail Clubbing] : no clubbing  or cyanosis of the fingernails [Musculoskeletal - Swelling] : no joint swelling seen [Skin Color & Pigmentation] : normal skin color and pigmentation [Skin Turgor] : normal skin turgor [] : no rash [Occult Blood Positive] : stool was negative for occult blood [FreeTextEntry1] : mild dementia

## 2021-04-14 NOTE — HISTORY OF PRESENT ILLNESS
[FreeTextEntry1] : For the past couple of weeks, Angeline has noted vague abdominal and some anal discomfort.  She has decreased appetite and has lost a few pounds.  She also feels listless, becomes exhausted after walking short distances.  She also notes urinary frequency but denies fever or vomiting.  She has been taking baby aspirin daily for years.  She was felt to perhaps have sciatica, took 1 Aleve or Advil last week, seemed to have helped.  She underwent two ERCPs with sphincterotomy and removal of CBD stones last spring.  Last colonoscopy May 2013 revealed mucosal polyps and hemorrhoids; hyperplastic polyps were removed at colonoscopy 2008.  She is on study protocol for dementia, to receive infusion tomorrow.

## 2021-04-15 LAB
ALBUMIN SERPL ELPH-MCNC: 4.7 G/DL
ALP BLD-CCNC: 71 U/L
ALT SERPL-CCNC: 12 U/L
AMYLASE/CREAT SERPL: 84 U/L
ANION GAP SERPL CALC-SCNC: 16 MMOL/L
AST SERPL-CCNC: 16 U/L
BASOPHILS # BLD AUTO: 0.03 K/UL
BASOPHILS NFR BLD AUTO: 0.3 %
BILIRUB DIRECT SERPL-MCNC: 0.2 MG/DL
BILIRUB SERPL-MCNC: 0.8 MG/DL
BUN SERPL-MCNC: 19 MG/DL
CALCIUM SERPL-MCNC: 10.4 MG/DL
CHLORIDE SERPL-SCNC: 102 MMOL/L
CHOLEST SERPL-MCNC: 255 MG/DL
CO2 SERPL-SCNC: 25 MMOL/L
CREAT SERPL-MCNC: 0.88 MG/DL
CRP SERPL-MCNC: <3 MG/L
EOSINOPHIL # BLD AUTO: 0.25 K/UL
EOSINOPHIL NFR BLD AUTO: 2.7 %
ERYTHROCYTE [SEDIMENTATION RATE] IN BLOOD BY WESTERGREN METHOD: 7 MM/HR
ESTIMATED AVERAGE GLUCOSE: 114 MG/DL
FERRITIN SERPL-MCNC: 47 NG/ML
GGT SERPL-CCNC: 22 U/L
GLUCOSE SERPL-MCNC: 123 MG/DL
HBA1C MFR BLD HPLC: 5.6 %
HCT VFR BLD CALC: 48.2 %
HDLC SERPL-MCNC: 91 MG/DL
HGB BLD-MCNC: 15.5 G/DL
IMM GRANULOCYTES NFR BLD AUTO: 0.2 %
IRON SATN MFR SERPL: 23 %
IRON SERPL-MCNC: 82 UG/DL
LDLC SERPL CALC-MCNC: 149 MG/DL
LPL SERPL-CCNC: 25 U/L
LYMPHOCYTES # BLD AUTO: 1.6 K/UL
LYMPHOCYTES NFR BLD AUTO: 17.2 %
MAGNESIUM SERPL-MCNC: 2.1 MG/DL
MAN DIFF?: NORMAL
MCHC RBC-ENTMCNC: 31.1 PG
MCHC RBC-ENTMCNC: 32.2 GM/DL
MCV RBC AUTO: 96.6 FL
MONOCYTES # BLD AUTO: 0.59 K/UL
MONOCYTES NFR BLD AUTO: 6.4 %
NEUTROPHILS # BLD AUTO: 6.8 K/UL
NEUTROPHILS NFR BLD AUTO: 73.2 %
NONHDLC SERPL-MCNC: 164 MG/DL
PHOSPHATE SERPL-MCNC: 3.2 MG/DL
PLATELET # BLD AUTO: 329 K/UL
POTASSIUM SERPL-SCNC: 4.9 MMOL/L
PROT SERPL-MCNC: 6.8 G/DL
RBC # BLD: 4.99 M/UL
RBC # FLD: 14.4 %
SODIUM SERPL-SCNC: 142 MMOL/L
T3 SERPL-MCNC: 114 NG/DL
T3RU NFR SERPL: 1 TBI
T4 FREE SERPL-MCNC: 1.5 NG/DL
T4 SERPL-MCNC: 9.5 UG/DL
TIBC SERPL-MCNC: 365 UG/DL
TRIGL SERPL-MCNC: 71 MG/DL
TSH SERPL-ACNC: 1.42 UIU/ML
UIBC SERPL-MCNC: 283 UG/DL
WBC # FLD AUTO: 9.29 K/UL

## 2021-04-19 ENCOUNTER — NON-APPOINTMENT (OUTPATIENT)
Age: 71
End: 2021-04-19

## 2021-09-14 ENCOUNTER — APPOINTMENT (OUTPATIENT)
Dept: NEUROLOGY | Facility: CLINIC | Age: 71
End: 2021-09-14
Payer: MEDICARE

## 2021-09-14 DIAGNOSIS — G31.84 MILD COGNITIVE IMPAIRMENT, SO STATED: ICD-10-CM

## 2021-09-14 DIAGNOSIS — R63.0 ANOREXIA: ICD-10-CM

## 2021-09-14 DIAGNOSIS — F41.9 ANXIETY DISORDER, UNSPECIFIED: ICD-10-CM

## 2021-09-14 PROCEDURE — 99214 OFFICE O/P EST MOD 30 MIN: CPT

## 2021-09-14 NOTE — HISTORY OF PRESENT ILLNESS
[FreeTextEntry1] : COVID VACCINE FULL.\par NO COVID.\par \par HPI-Interval hx [id number]:\par Pt here for follow-up.\par She has been more active, exercising with PT now, and lost a few lbs. \par Diet better.\par Sleep ok.\par She has developed more significant apraxia, motor, ideational and ideomotor.\par Rest is stable.\par \par \par PMH:\par Per Dr. Bryant, 2016:\par 66 year-old right-handed woman who has had some cognitive complaints over approximately 1-1/2 years. She thinks that the symptoms have gradually progressed to a slight degree over this period of time. The first thing that they noticed was that she had difficulty learning how to use a new car. She continued to drive her old car for about 6 months thereafter, but then stopped driving because driving made her feel very anxious. She remains independent for all of her other usual activities of daily living, including using an iPad and a computer, cooking, household chores, and basic activities of daily living. Her  says that she has had mild short-term memory problems, such as misplacing objects. She says that she may go downstairs and forget why she had gone down there. When this occurs, it may come back to her later on. She does not repeat herself during conversations.\par She has a lifelong history of anxiety. She has been under the care of Dr. Olga Prieto for the past 3 years and has been taking escitalopram 10 mg daily. She also takes lorazepam 0.5 mg as needed, but rarely needs to do so. She denies depressed mood. She says that she has no problems with sleep. She had lost about 50 pounds over the preceding 5 years, but her weight has been relatively stable over the past year. There is no history of suicidal ideation. There have been no hallucinations, delusions, changes in personality, or aberrant behavior.\par She saw Dr. Travis Diaz on 05/12/2016, with a follow-up visit on 06/23/2016. Her workup included an MRI of brain on 05/26/2016 that was reportedly unremarkable, as well as normal sedimentation rate, vitamin B12, RPR, EMILY, Lyme, and TSH. He discussed the possibility of a PET scan with them, but this has not yet been done. He started her on donepezil 5 mg daily as of 06/23/2016, increased to 10 mg daily one month thereafter. She initially had some mild nausea from donepezil, which was mitigated by taking it in the morning with food. She has had no other adverse effects from donepezil. She thinks that donepezil may have been somewhat helpful, but says that this may also be "wishful thinking". She has never taken any other medications for her memory symptoms. Of note, she has been taking oxybutynin for urinary frequency over approximately the past 4 years. \par \par Current status:\par Pt not seen at [facility] since 2016, since she was enrolled in clinical trials at [facility], and still in our [email or web address] clinical trial.\par Pt has tolerated the Rx well (on open label study arm).\par She has had a progression, which is in line to what expected for the disease. \par \par -Memory: STM\par -Speech: more circumstantial, difficulty finding words\par -Orientation: poor for time, knows her home and neighborhood and [facility]\par -Praxis: limited use of appliances, can manage her own tasks e.g. food and washing\par -Decision making/Executive fx/Multitasking:\par \par -Sleep: ok\par \par -Appetite: ok\par \par -Motor symptoms: slower gait, some issues with stairs, but doing well when holding banisters; tends to be hesitant and has some fear of falling\par \par -B/B: ok, doing well with VESIcare\par \par -Psychiatric symptoms: doing well\par \par -Functional status:\par ADL: can dress and wash, but needs prompting and reminders for most tasks\par IADL: poor\par CDR: 1.5\par \par -Professional status: former teacher\par \par PCP and other physicians:\par -PCP: GERSON BALTAZAR\par -Neuro: Manny\par \par Workup done: Reviewed on PACS.

## 2021-09-14 NOTE — PHYSICAL EXAM
[General Appearance - Alert] : alert [General Appearance - In No Acute Distress] : in no acute distress [Impaired Insight] : insight and judgment were intact [Affect] : the affect was normal [Person] : oriented to person [Remote Intact] : remote memory intact [Registration Intact] : recent registration memory intact [Concentration Intact] : normal concentrating ability [Naming Objects] : no difficulty naming common objects [Repeating Phrases] : no difficulty repeating a phrase [Fluency] : fluency intact [Comprehension] : comprehension intact [Past History] : adequate knowledge of personal past history [Cranial Nerves Optic (II)] : visual acuity intact bilaterally,  visual fields full to confrontation, pupils equal round and reactive to light [Cranial Nerves Oculomotor (III)] : extraocular motion intact [Cranial Nerves Trigeminal (V)] : facial sensation intact symmetrically [Cranial Nerves Facial (VII)] : face symmetrical [Cranial Nerves Vestibulocochlear (VIII)] : hearing was intact bilaterally [Cranial Nerves Glossopharyngeal (IX)] : tongue and palate midline [Cranial Nerves Accessory (XI - Cranial And Spinal)] : head turning and shoulder shrug symmetric [Cranial Nerves Hypoglossal (XII)] : there was no tongue deviation with protrusion [Motor Strength] : muscle strength was normal in all four extremities [No Muscle Atrophy] : normal bulk in all four extremities [Motor Handedness Right-Handed] : the patient is right hand dominant [Sensation Tactile Decrease] : light touch was intact [Sensation Vibration Decrease] : vibration was intact [Balance] : balance was intact [3+] : Ankle jerk right 3+ [2+] : Ankle jerk left 2+ [Sclera] : the sclera and conjunctiva were normal [PERRL With Normal Accommodation] : pupils were equal in size, round, reactive to light, with normal accommodation [Extraocular Movements] : extraocular movements were intact [Optic Disc Abnormality] : the optic disc were normal in size and color [No APD] : no afferent pupillary defect [No JUAN MANUEL] : no internuclear ophthalmoplegia [Outer Ear] : the ears and nose were normal in appearance [Oropharynx] : the oropharynx was normal [Neck Appearance] : the appearance of the neck was normal [Neck Cervical Mass (___cm)] : no neck mass was observed [Jugular Venous Distention Increased] : there was no jugular-venous distention [Thyroid Diffuse Enlargement] : the thyroid was not enlarged [Thyroid Nodule] : there were no palpable thyroid nodules [Auscultation Breath Sounds / Voice Sounds] : lungs were clear to auscultation bilaterally [Heart Rate And Rhythm] : heart rate was normal and rhythm regular [Heart Sounds] : normal S1 and S2 [Heart Sounds Gallop] : no gallops [Murmurs] : no murmurs [Heart Sounds Pericardial Friction Rub] : no pericardial rub [Arterial Pulses Carotid] : carotid pulses were normal with no bruits [Full Pulse] : the pedal pulses are present [Edema] : there was no peripheral edema [Bowel Sounds] : normal bowel sounds [Abdomen Soft] : soft [Abdomen Tenderness] : non-tender [Abdomen Mass (___ Cm)] : no abdominal mass palpated [No CVA Tenderness] : no ~M costovertebral angle tenderness [No Spinal Tenderness] : no spinal tenderness [Nail Clubbing] : no clubbing  or cyanosis of the fingernails [Musculoskeletal - Swelling] : no joint swelling seen [Motor Tone] : muscle strength and tone were normal [Skin Color & Pigmentation] : normal skin color and pigmentation [Skin Turgor] : normal skin turgor [] : no rash [FreeTextEntry1] : Exam stable. [Place] : disoriented to place [Time] : disoriented to time [Short Term Intact] : short term memory impaired [Span Intact] : the attention span was decreased [Visual Intact] : visual attention was ~T ~L decreased [Writing A Sentence] : difficulty writing a sentence [Reading] : difficulty reading [Current Events] : inadequate knowledge of current events [Vocabulary] : inadequate range of vocabulary [Motor Strength Upper Extremities Bilaterally] : strength was normal in both upper extremities [Motor Strength Lower Extremities Bilaterally] : strength was normal in both lower extremities [Romberg's Sign] : Romberg's sign was negtive [Dysesthesia] : no dysesthesia [Hyperesthesia] : no hyperesthesia [Past-pointing] : there was no past-pointing [Tremor] : no tremor present [Plantar Reflex Right Only] : normal on the right [Plantar Reflex Left Only] : normal on the left [FreeTextEntry4] : Exam limited.\par Understands and obeys simple commands.\par Presidents: cannot name, says "the good one, the bad one"; knows about the virus, not the name.\par Cannot read, cannot distinguish shapes; cannot write; no optic ataxia, but 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

## 2021-09-14 NOTE — DATA REVIEWED
[de-identified] : \par MRI brain without contrast report from 05/26/2016 reviewed: Essentially normal noncontrast brain MRI. [de-identified] : \par Blood test results from 03/29/2016 reviewed: Normal CBC, normal chemistry profile, cholesterol 244 mg/dL, LDL cholesterol 123 mg/dL, normal TSH, normal total T4, hemoglobin A1c 5.8%, ESR 5, vitamin B12 1674 pg/mL, normal folate, nonreactive RPR, negative EMILY, negative Lyme IgG and IgM serology.\par \par Carotid Doppler report from 06/20/2016 reviewed: No hemodynamically significant stenosis or occlusion of either internal carotid artery.\par \par Dr. Travis Diaz's consult note from 06/23/2016 reviewed.\par \par

## 2021-11-03 ENCOUNTER — RX RENEWAL (OUTPATIENT)
Age: 71
End: 2021-11-03

## 2022-04-04 ENCOUNTER — APPOINTMENT (OUTPATIENT)
Dept: NEUROLOGY | Facility: CLINIC | Age: 72
End: 2022-04-04
Payer: MEDICARE

## 2022-04-04 VITALS — HEART RATE: 92 BPM | DIASTOLIC BLOOD PRESSURE: 82 MMHG | SYSTOLIC BLOOD PRESSURE: 149 MMHG

## 2022-04-04 PROCEDURE — 99214 OFFICE O/P EST MOD 30 MIN: CPT

## 2022-04-04 RX ORDER — NITROFURANTOIN (MONOHYDRATE/MACROCRYSTALS) 25; 75 MG/1; MG/1
100 CAPSULE ORAL
Qty: 20 | Refills: 0 | Status: COMPLETED | COMMUNITY
Start: 2021-12-22

## 2022-04-04 RX ORDER — AZITHROMYCIN 250 MG/1
250 TABLET, FILM COATED ORAL
Qty: 6 | Refills: 0 | Status: COMPLETED | COMMUNITY
Start: 2021-12-10

## 2022-04-04 NOTE — HISTORY OF PRESENT ILLNESS
[FreeTextEntry1] : COVID VACCINE FULL.\par NO COVID.\par \par HPI-Interval hx [id number]:\par pt here with .\par A few weeks ago the pt decided, very lucidly, to go into a Memory Unit @ the [facility] in [place].\par Decision was reviewed with the whole family and they are all in agreement. \par Motor ok.\par Sleep and appetite are stable.\par She has developed a mild itch in the hands and face, scratching from time to time, but no lesions.\par Rest is stable.\par \par HPI-Interval hx [id number]:\par Pt here for follow-up.\par She has been more active, exercising with PT now, and lost a few lbs. \par Diet better.\par Sleep ok.\par She has developed more significant apraxia, motor, ideational and ideomotor.\par Rest is stable.\par \par \par PMH:\par Per Dr. Bryant, 2016:\par 66 year-old right-handed woman who has had some cognitive complaints over approximately 1-1/2 years. She thinks that the symptoms have gradually progressed to a slight degree over this period of time. The first thing that they noticed was that she had difficulty learning how to use a new car. She continued to drive her old car for about 6 months thereafter, but then stopped driving because driving made her feel very anxious. She remains independent for all of her other usual activities of daily living, including using an iPad and a computer, cooking, household chores, and basic activities of daily living. Her  says that she has had mild short-term memory problems, such as misplacing objects. She says that she may go downstairs and forget why she had gone down there. When this occurs, it may come back to her later on. She does not repeat herself during conversations.\par She has a lifelong history of anxiety. She has been under the care of Dr. Olga Prieto for the past 3 years and has been taking escitalopram 10 mg daily. She also takes lorazepam 0.5 mg as needed, but rarely needs to do so. She denies depressed mood. She says that she has no problems with sleep. She had lost about 50 pounds over the preceding 5 years, but her weight has been relatively stable over the past year. There is no history of suicidal ideation. There have been no hallucinations, delusions, changes in personality, or aberrant behavior.\par She saw Dr. Travis Diaz on 05/12/2016, with a follow-up visit on 06/23/2016. Her workup included an MRI of brain on 05/26/2016 that was reportedly unremarkable, as well as normal sedimentation rate, vitamin B12, RPR, EMILY, Lyme, and TSH. He discussed the possibility of a PET scan with them, but this has not yet been done. He started her on donepezil 5 mg daily as of 06/23/2016, increased to 10 mg daily one month thereafter. She initially had some mild nausea from donepezil, which was mitigated by taking it in the morning with food. She has had no other adverse effects from donepezil. She thinks that donepezil may have been somewhat helpful, but says that this may also be "wishful thinking". She has never taken any other medications for her memory symptoms. Of note, she has been taking oxybutynin for urinary frequency over approximately the past 4 years. \par \par Current status:\par Pt not seen at [facility] since 2016, since she was enrolled in clinical trials at [facility], and still in our needmade clinical trial.\par Pt has tolerated the Rx well (on open label study arm).\par She has had a progression, which is in line to what expected for the disease. \par \par -Memory: STM\par -Speech: more circumstantial, difficulty finding words\par -Orientation: poor for time, [facility]\par -Praxis: limited use of appliances, can manage her own tasks e.g. food and washing\par -Decision making/Executive fx/Multitasking:\par \par -Sleep: ok\par \par -Appetite: ok\par \par -Motor symptoms: slower gait, some issues with stairs, but doing well when holding banisters; tends to be hesitant and has some fear of falling\par \par -B/B: ok, doing well with [employer]\par \par -Psychiatric symptoms: doing well\par \par -Functional status:\par ADL: can dress and wash, but needs prompting and reminders for most tasks\par IADL: poor\par CDR: 1.5\par \par -Professional status: former teacher\par \par PCP and other physicians:\par -PCP: GERSON BALTAZAR\par -Neuro: Manny\par \par Workup done: Reviewed on PACS.

## 2022-04-04 NOTE — ASSESSMENT
[FreeTextEntry1] : Assessment:\par 70yo RH WW, with ongoing LOAD dementia, now moderate-advanced. 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.\par Developing more apraxia, but overall stable.\par \par Pt will be admitted to [facility] in May.\par \par Diagnostic Impression:\par -LOAD/PCA\par \par Plan:\par -continue current Rx\par -recommend to continue PT and stay active.\par \par A thorough discussion was entertained with the patient/caregiver regarding the use of psychoactive medications, their possible benefits and AE profile, including the risk of cardiovascular complications, including but not limited to applicable black box warning and teratogenicity, where appropriate.\par We discussed the benefits of being active, physically and mentally, and the need to to establish a routine in this respect.\par Driving abilities and firearms possession and use were discussed, in relation to progression of the cognitive decline, and the need to assess them periodically.\par Patient/caregiver advised to bring previous records to this office.\par All questions were answered at the time of the visit. We are certainly available for further discussion as needed.\par Patient/caregiver fully understands and agree with the plan.\par

## 2022-04-04 NOTE — DATA REVIEWED
[de-identified] : \par MRI brain without contrast report from 05/26/2016 reviewed: Essentially normal noncontrast brain MRI. [de-identified] : \par Blood test results from 03/29/2016 reviewed: Normal CBC, normal chemistry profile, cholesterol 244 mg/dL, LDL cholesterol 123 mg/dL, normal TSH, normal total T4, hemoglobin A1c 5.8%, ESR 5, vitamin B12 1674 pg/mL, normal folate, nonreactive RPR, negative EMILY, negative Lyme IgG and IgM serology.\par \par Carotid Doppler report from 06/20/2016 reviewed: No hemodynamically significant stenosis or occlusion of either internal carotid artery.\par \par Dr. Travis Diaz's consult note from 06/23/2016 reviewed.\par \par

## 2022-04-15 ENCOUNTER — RX RENEWAL (OUTPATIENT)
Age: 72
End: 2022-04-15

## 2022-09-12 ENCOUNTER — APPOINTMENT (OUTPATIENT)
Dept: NEUROLOGY | Facility: CLINIC | Age: 72
End: 2022-09-12

## 2022-09-12 VITALS
HEART RATE: 70 BPM | WEIGHT: 130 LBS | BODY MASS INDEX: 24.55 KG/M2 | HEIGHT: 61 IN | SYSTOLIC BLOOD PRESSURE: 142 MMHG | DIASTOLIC BLOOD PRESSURE: 82 MMHG

## 2022-09-12 DIAGNOSIS — R44.1 VISUAL HALLUCINATIONS: ICD-10-CM

## 2022-09-12 DIAGNOSIS — H53.10 UNSPECIFIED SUBJECTIVE VISUAL DISTURBANCES: ICD-10-CM

## 2022-09-12 DIAGNOSIS — F02.80 ALZHEIMER'S DISEASE WITH LATE ONSET: ICD-10-CM

## 2022-09-12 DIAGNOSIS — G30.1 ALZHEIMER'S DISEASE WITH LATE ONSET: ICD-10-CM

## 2022-09-12 DIAGNOSIS — G31.9 DEGENERATIVE DISEASE OF NERVOUS SYSTEM, UNSPECIFIED: ICD-10-CM

## 2022-09-12 PROCEDURE — 99214 OFFICE O/P EST MOD 30 MIN: CPT

## 2022-09-12 RX ORDER — ESCITALOPRAM OXALATE 20 MG/1
20 TABLET ORAL DAILY
Qty: 30 | Refills: 2 | Status: ACTIVE | COMMUNITY
Start: 2019-12-19

## 2022-09-12 NOTE — DATA REVIEWED
[de-identified] : \par MRI brain without contrast report from 05/26/2016 reviewed: Essentially normal noncontrast brain MRI. [de-identified] : \par Blood test results from 03/29/2016 reviewed: Normal CBC, normal chemistry profile, cholesterol 244 mg/dL, LDL cholesterol 123 mg/dL, normal TSH, normal total T4, hemoglobin A1c 5.8%, ESR 5, vitamin B12 1674 pg/mL, normal folate, nonreactive RPR, negative EMILY, negative Lyme IgG and IgM serology.\par \par Carotid Doppler report from 06/20/2016 reviewed: No hemodynamically significant stenosis or occlusion of either internal carotid artery.\par \par Dr. Travis Diaz's consult note from 06/23/2016 reviewed.\par \par

## 2022-09-12 NOTE — PHYSICAL EXAM
[General Appearance - Alert] : alert [General Appearance - In No Acute Distress] : in no acute distress [Impaired Insight] : insight and judgment were intact [Affect] : the affect was normal [Person] : oriented to person [Remote Intact] : remote memory intact [Registration Intact] : recent registration memory intact [Concentration Intact] : normal concentrating ability [Naming Objects] : no difficulty naming common objects [Repeating Phrases] : no difficulty repeating a phrase [Fluency] : fluency intact [Comprehension] : comprehension intact [Past History] : adequate knowledge of personal past history [Cranial Nerves Optic (II)] : visual acuity intact bilaterally,  visual fields full to confrontation, pupils equal round and reactive to light [Cranial Nerves Oculomotor (III)] : extraocular motion intact [Cranial Nerves Trigeminal (V)] : facial sensation intact symmetrically [Cranial Nerves Facial (VII)] : face symmetrical [Cranial Nerves Vestibulocochlear (VIII)] : hearing was intact bilaterally [Cranial Nerves Glossopharyngeal (IX)] : tongue and palate midline [Cranial Nerves Accessory (XI - Cranial And Spinal)] : head turning and shoulder shrug symmetric [Cranial Nerves Hypoglossal (XII)] : there was no tongue deviation with protrusion [Motor Strength] : muscle strength was normal in all four extremities [No Muscle Atrophy] : normal bulk in all four extremities [Motor Handedness Right-Handed] : the patient is right hand dominant [Sensation Tactile Decrease] : light touch was intact [Sensation Vibration Decrease] : vibration was intact [Balance] : balance was intact [3+] : Ankle jerk right 3+ [2+] : Ankle jerk left 2+ [Sclera] : the sclera and conjunctiva were normal [PERRL With Normal Accommodation] : pupils were equal in size, round, reactive to light, with normal accommodation [Extraocular Movements] : extraocular movements were intact [Optic Disc Abnormality] : the optic disc were normal in size and color [No APD] : no afferent pupillary defect [No JUAN MANUEL] : no internuclear ophthalmoplegia [Outer Ear] : the ears and nose were normal in appearance [Oropharynx] : the oropharynx was normal [Neck Appearance] : the appearance of the neck was normal [Neck Cervical Mass (___cm)] : no neck mass was observed [Jugular Venous Distention Increased] : there was no jugular-venous distention [Thyroid Diffuse Enlargement] : the thyroid was not enlarged [Thyroid Nodule] : there were no palpable thyroid nodules [Auscultation Breath Sounds / Voice Sounds] : lungs were clear to auscultation bilaterally [Heart Rate And Rhythm] : heart rate was normal and rhythm regular [Heart Sounds] : normal S1 and S2 [Heart Sounds Gallop] : no gallops [Murmurs] : no murmurs [Heart Sounds Pericardial Friction Rub] : no pericardial rub [Arterial Pulses Carotid] : carotid pulses were normal with no bruits [Full Pulse] : the pedal pulses are present [Edema] : there was no peripheral edema [Bowel Sounds] : normal bowel sounds [Abdomen Soft] : soft [Abdomen Tenderness] : non-tender [Abdomen Mass (___ Cm)] : no abdominal mass palpated [No CVA Tenderness] : no ~M costovertebral angle tenderness [No Spinal Tenderness] : no spinal tenderness [Nail Clubbing] : no clubbing  or cyanosis of the fingernails [Musculoskeletal - Swelling] : no joint swelling seen [Motor Tone] : muscle strength and tone were normal [Skin Color & Pigmentation] : normal skin color and pigmentation [Skin Turgor] : normal skin turgor [] : no rash [Place] : disoriented to place [Time] : disoriented to time [Short Term Intact] : short term memory impaired [Span Intact] : the attention span was decreased [Visual Intact] : visual attention was ~T ~L decreased [Writing A Sentence] : difficulty writing a sentence [Reading] : difficulty reading [Current Events] : inadequate knowledge of current events [Vocabulary] : inadequate range of vocabulary [Motor Strength Upper Extremities Bilaterally] : strength was normal in both upper extremities [Motor Strength Lower Extremities Bilaterally] : strength was normal in both lower extremities [Romberg's Sign] : Romberg's sign was negtive [Dysesthesia] : no dysesthesia [Hyperesthesia] : no hyperesthesia [Past-pointing] : there was no past-pointing [Tremor] : no tremor present [Plantar Reflex Right Only] : normal on the right [Plantar Reflex Left Only] : normal on the left [FreeTextEntry4] : Exam limited.\par Understands and obeys simple commands.\par Presidents: cannot name, says "the good one, the bad one"; knows about the virus, not the name.\par Cannot read, cannot distinguish shapes; cannot write; no optic ataxia, but 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 Postural and  resting tremor in R>L hand. 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

## 2022-09-12 NOTE — ASSESSMENT
[FreeTextEntry1] : Assessment:\par 71yo RH WW, with ongoing LOAD dementia, moderate-advanced. 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.\par Developing more apraxia, but overall stable.\par VH now, but not too much distress.\par \par Diagnostic Impression:\par -LOAD/PCA\par \par Plan:\par -continue current Rx\par -if VH get worse, we can consider Olanzapine low dose\par -recommend to continue PT and stay active.\par \par A thorough discussion was entertained with the patient/caregiver regarding the use of psychoactive medications, their possible benefits and AE profile, including the risk of cardiovascular complications, including but not limited to applicable black box warning and teratogenicity, where appropriate.\par We discussed the benefits of being active, physically and mentally, and the need to to establish a routine in this respect.\par Driving abilities and firearms possession and use were discussed, in relation to progression of the cognitive decline, and the need to assess them periodically.\par Patient/caregiver advised to bring previous records to this office.\par All questions were answered at the time of the visit. We are certainly available for further discussion as needed.\par Patient/caregiver fully understands and agree with the plan.\par

## 2022-10-31 ENCOUNTER — RX RENEWAL (OUTPATIENT)
Age: 72
End: 2022-10-31

## 2022-12-07 ENCOUNTER — EMERGENCY (EMERGENCY)
Facility: HOSPITAL | Age: 72
LOS: 1 days | Discharge: HOME | End: 2022-12-07
Attending: EMERGENCY MEDICINE | Admitting: EMERGENCY MEDICINE
Payer: MEDICARE

## 2022-12-07 VITALS
TEMPERATURE: 98 F | HEART RATE: 68 BPM | SYSTOLIC BLOOD PRESSURE: 156 MMHG | HEIGHT: 61 IN | OXYGEN SATURATION: 96 % | DIASTOLIC BLOOD PRESSURE: 78 MMHG | WEIGHT: 130.07 LBS | RESPIRATION RATE: 18 BRPM

## 2022-12-07 VITALS
HEART RATE: 65 BPM | RESPIRATION RATE: 18 BRPM | OXYGEN SATURATION: 97 % | SYSTOLIC BLOOD PRESSURE: 130 MMHG | DIASTOLIC BLOOD PRESSURE: 75 MMHG | TEMPERATURE: 99 F

## 2022-12-07 LAB
ALBUMIN SERPL ELPH-MCNC: 3.3 G/DL — SIGNIFICANT CHANGE UP (ref 3.3–5)
ALP SERPL-CCNC: 91 U/L — SIGNIFICANT CHANGE UP (ref 30–120)
ALT FLD-CCNC: 15 U/L DA — SIGNIFICANT CHANGE UP (ref 10–60)
ANION GAP SERPL CALC-SCNC: 9 MMOL/L — SIGNIFICANT CHANGE UP (ref 5–17)
APPEARANCE UR: ABNORMAL
AST SERPL-CCNC: 14 U/L — SIGNIFICANT CHANGE UP (ref 10–40)
BACTERIA # UR AUTO: ABNORMAL
BASOPHILS # BLD AUTO: 0.02 K/UL — SIGNIFICANT CHANGE UP (ref 0–0.2)
BASOPHILS NFR BLD AUTO: 0.2 % — SIGNIFICANT CHANGE UP (ref 0–2)
BILIRUB SERPL-MCNC: 0.5 MG/DL — SIGNIFICANT CHANGE UP (ref 0.2–1.2)
BILIRUB UR-MCNC: NEGATIVE — SIGNIFICANT CHANGE UP
BUN SERPL-MCNC: 20 MG/DL — SIGNIFICANT CHANGE UP (ref 7–23)
CALCIUM SERPL-MCNC: 9.3 MG/DL — SIGNIFICANT CHANGE UP (ref 8.4–10.5)
CHLORIDE SERPL-SCNC: 103 MMOL/L — SIGNIFICANT CHANGE UP (ref 96–108)
CO2 SERPL-SCNC: 30 MMOL/L — SIGNIFICANT CHANGE UP (ref 22–31)
COLOR SPEC: YELLOW — SIGNIFICANT CHANGE UP
CREAT SERPL-MCNC: 0.93 MG/DL — SIGNIFICANT CHANGE UP (ref 0.5–1.3)
DIFF PNL FLD: ABNORMAL
EGFR: 65 ML/MIN/1.73M2 — SIGNIFICANT CHANGE UP
EOSINOPHIL # BLD AUTO: 0 K/UL — SIGNIFICANT CHANGE UP (ref 0–0.5)
EOSINOPHIL NFR BLD AUTO: 0 % — SIGNIFICANT CHANGE UP (ref 0–6)
EPI CELLS # UR: SIGNIFICANT CHANGE UP
GLUCOSE SERPL-MCNC: 168 MG/DL — HIGH (ref 70–99)
GLUCOSE UR QL: NEGATIVE MG/DL — SIGNIFICANT CHANGE UP
HCT VFR BLD CALC: 41.3 % — SIGNIFICANT CHANGE UP (ref 34.5–45)
HGB BLD-MCNC: 13.8 G/DL — SIGNIFICANT CHANGE UP (ref 11.5–15.5)
IMM GRANULOCYTES NFR BLD AUTO: 0.2 % — SIGNIFICANT CHANGE UP (ref 0–0.9)
KETONES UR-MCNC: NEGATIVE — SIGNIFICANT CHANGE UP
LEUKOCYTE ESTERASE UR-ACNC: ABNORMAL
LIDOCAIN IGE QN: 146 U/L — SIGNIFICANT CHANGE UP (ref 73–393)
LYMPHOCYTES # BLD AUTO: 1.18 K/UL — SIGNIFICANT CHANGE UP (ref 1–3.3)
LYMPHOCYTES # BLD AUTO: 11.8 % — LOW (ref 13–44)
MCHC RBC-ENTMCNC: 30.4 PG — SIGNIFICANT CHANGE UP (ref 27–34)
MCHC RBC-ENTMCNC: 33.4 GM/DL — SIGNIFICANT CHANGE UP (ref 32–36)
MCV RBC AUTO: 91 FL — SIGNIFICANT CHANGE UP (ref 80–100)
MONOCYTES # BLD AUTO: 0.64 K/UL — SIGNIFICANT CHANGE UP (ref 0–0.9)
MONOCYTES NFR BLD AUTO: 6.4 % — SIGNIFICANT CHANGE UP (ref 2–14)
NEUTROPHILS # BLD AUTO: 8.17 K/UL — HIGH (ref 1.8–7.4)
NEUTROPHILS NFR BLD AUTO: 81.4 % — HIGH (ref 43–77)
NITRITE UR-MCNC: NEGATIVE — SIGNIFICANT CHANGE UP
NRBC # BLD: 0 /100 WBCS — SIGNIFICANT CHANGE UP (ref 0–0)
PH UR: 8 — SIGNIFICANT CHANGE UP (ref 5–8)
PLATELET # BLD AUTO: 387 K/UL — SIGNIFICANT CHANGE UP (ref 150–400)
POTASSIUM SERPL-MCNC: 3.7 MMOL/L — SIGNIFICANT CHANGE UP (ref 3.5–5.3)
POTASSIUM SERPL-SCNC: 3.7 MMOL/L — SIGNIFICANT CHANGE UP (ref 3.5–5.3)
PROT SERPL-MCNC: 6.9 G/DL — SIGNIFICANT CHANGE UP (ref 6–8.3)
PROT UR-MCNC: 15 MG/DL
RBC # BLD: 4.54 M/UL — SIGNIFICANT CHANGE UP (ref 3.8–5.2)
RBC # FLD: 13.5 % — SIGNIFICANT CHANGE UP (ref 10.3–14.5)
RBC CASTS # UR COMP ASSIST: ABNORMAL /HPF (ref 0–4)
SARS-COV-2 RNA SPEC QL NAA+PROBE: SIGNIFICANT CHANGE UP
SODIUM SERPL-SCNC: 142 MMOL/L — SIGNIFICANT CHANGE UP (ref 135–145)
SP GR SPEC: 1.01 — SIGNIFICANT CHANGE UP (ref 1.01–1.02)
TROPONIN I, HIGH SENSITIVITY RESULT: 7.3 NG/L — SIGNIFICANT CHANGE UP
UROBILINOGEN FLD QL: NEGATIVE MG/DL — SIGNIFICANT CHANGE UP
WBC # BLD: 10.03 K/UL — SIGNIFICANT CHANGE UP (ref 3.8–10.5)
WBC # FLD AUTO: 10.03 K/UL — SIGNIFICANT CHANGE UP (ref 3.8–10.5)
WBC UR QL: ABNORMAL

## 2022-12-07 PROCEDURE — 80053 COMPREHEN METABOLIC PANEL: CPT

## 2022-12-07 PROCEDURE — 96365 THER/PROPH/DIAG IV INF INIT: CPT | Mod: XU

## 2022-12-07 PROCEDURE — 96361 HYDRATE IV INFUSION ADD-ON: CPT

## 2022-12-07 PROCEDURE — 99285 EMERGENCY DEPT VISIT HI MDM: CPT | Mod: 25

## 2022-12-07 PROCEDURE — 87086 URINE CULTURE/COLONY COUNT: CPT

## 2022-12-07 PROCEDURE — 93005 ELECTROCARDIOGRAM TRACING: CPT

## 2022-12-07 PROCEDURE — 93010 ELECTROCARDIOGRAM REPORT: CPT

## 2022-12-07 PROCEDURE — 87635 SARS-COV-2 COVID-19 AMP PRB: CPT

## 2022-12-07 PROCEDURE — 84484 ASSAY OF TROPONIN QUANT: CPT

## 2022-12-07 PROCEDURE — 36415 COLL VENOUS BLD VENIPUNCTURE: CPT

## 2022-12-07 PROCEDURE — 85025 COMPLETE CBC W/AUTO DIFF WBC: CPT

## 2022-12-07 PROCEDURE — 74177 CT ABD & PELVIS W/CONTRAST: CPT | Mod: MA

## 2022-12-07 PROCEDURE — 81001 URINALYSIS AUTO W/SCOPE: CPT

## 2022-12-07 PROCEDURE — 74177 CT ABD & PELVIS W/CONTRAST: CPT | Mod: 26,MA

## 2022-12-07 PROCEDURE — 99285 EMERGENCY DEPT VISIT HI MDM: CPT | Mod: FS

## 2022-12-07 PROCEDURE — 71045 X-RAY EXAM CHEST 1 VIEW: CPT | Mod: 26

## 2022-12-07 PROCEDURE — 96368 THER/DIAG CONCURRENT INF: CPT

## 2022-12-07 PROCEDURE — 71045 X-RAY EXAM CHEST 1 VIEW: CPT

## 2022-12-07 PROCEDURE — 83690 ASSAY OF LIPASE: CPT

## 2022-12-07 RX ORDER — ACETAMINOPHEN 500 MG
1000 TABLET ORAL ONCE
Refills: 0 | Status: COMPLETED | OUTPATIENT
Start: 2022-12-07 | End: 2022-12-07

## 2022-12-07 RX ORDER — SODIUM CHLORIDE 9 MG/ML
1000 INJECTION INTRAMUSCULAR; INTRAVENOUS; SUBCUTANEOUS ONCE
Refills: 0 | Status: COMPLETED | OUTPATIENT
Start: 2022-12-07 | End: 2022-12-07

## 2022-12-07 RX ORDER — MORPHINE SULFATE 50 MG/1
4 CAPSULE, EXTENDED RELEASE ORAL ONCE
Refills: 0 | Status: DISCONTINUED | OUTPATIENT
Start: 2022-12-07 | End: 2022-12-07

## 2022-12-07 RX ORDER — CEFUROXIME AXETIL 250 MG
1 TABLET ORAL
Qty: 14 | Refills: 0
Start: 2022-12-07 | End: 2022-12-13

## 2022-12-07 RX ORDER — CEFTRIAXONE 500 MG/1
1000 INJECTION, POWDER, FOR SOLUTION INTRAMUSCULAR; INTRAVENOUS ONCE
Refills: 0 | Status: COMPLETED | OUTPATIENT
Start: 2022-12-07 | End: 2022-12-07

## 2022-12-07 RX ADMIN — SODIUM CHLORIDE 1000 MILLILITER(S): 9 INJECTION INTRAMUSCULAR; INTRAVENOUS; SUBCUTANEOUS at 14:03

## 2022-12-07 RX ADMIN — Medication 400 MILLIGRAM(S): at 13:44

## 2022-12-07 RX ADMIN — SODIUM CHLORIDE 1000 MILLILITER(S): 9 INJECTION INTRAMUSCULAR; INTRAVENOUS; SUBCUTANEOUS at 11:32

## 2022-12-07 RX ADMIN — Medication 1000 MILLIGRAM(S): at 14:02

## 2022-12-07 RX ADMIN — CEFTRIAXONE 1000 MILLIGRAM(S): 500 INJECTION, POWDER, FOR SOLUTION INTRAMUSCULAR; INTRAVENOUS at 14:03

## 2022-12-07 RX ADMIN — MORPHINE SULFATE 4 MILLIGRAM(S): 50 CAPSULE, EXTENDED RELEASE ORAL at 12:23

## 2022-12-07 RX ADMIN — MORPHINE SULFATE 4 MILLIGRAM(S): 50 CAPSULE, EXTENDED RELEASE ORAL at 11:32

## 2022-12-07 RX ADMIN — CEFTRIAXONE 100 MILLIGRAM(S): 500 INJECTION, POWDER, FOR SOLUTION INTRAMUSCULAR; INTRAVENOUS at 13:44

## 2022-12-07 NOTE — ED ADULT NURSE NOTE - NSIMPLEMENTINTERV_GEN_ALL_ED
Implemented All Fall with Harm Risk Interventions:  [place] to call system. Call bell, personal items and telephone within reach. Instruct patient to call for assistance. Room bathroom lighting operational. Non-slip footwear when patient is off stretcher. Physically safe environment: no spills, clutter or unnecessary equipment. Stretcher in lowest position, wheels locked, appropriate side rails in place. Provide visual cue, wrist band, yellow gown, etc. Monitor gait and stability. Monitor for mental status changes and reorient to person, place, and time. Review medications for side effects contributing to fall risk. Reinforce activity limits and safety measures with patient and family. Provide visual clues: red socks.

## 2022-12-07 NOTE — ED PROVIDER NOTE - CARE PROVIDER_API CALL
Josue Duggan)  Obstetrics and Gynecology  [address] [place] [address]  Phone: [phone]  Fax: [phone]  Follow Up Time: 1-3 Days

## 2022-12-07 NOTE — ED PROVIDER NOTE - OBJECTIVE STATEMENT
72-year-old female with past medical history dementia and hypertension brought in from [facility] as [facility] assisted living by EMS for generlaized pain.  Patient was complaining of abdominal pain and left leg pain although is a poor historian.   states patient will often complain of pain "all over "and it is hard to discern where.  He states however this is slightly worse than prior.  Patient's last bowel movement was last night after dinner and was normal as per .  No falls. No fever. No vomiting or diarrhea.  t unable to provide further history.

## 2022-12-07 NOTE — ED PROVIDER NOTE - NSCAREINITIATED _GEN_ER
Tiffanie Crenshaw(NP) Cephalexin Counseling: I counseled the patient regarding use of cephalexin as an antibiotic for prophylactic and/or therapeutic purposes. Cephalexin (commonly prescribed under brand name Keflex) is a cephalosporin antibiotic which is active against numerous classes of bacteria, including most skin bacteria. Side effects may include nausea, diarrhea, gastrointestinal upset, rash, hives, yeast infections, and in rare cases, hepatitis, kidney disease, seizures, fever, confusion, neurologic symptoms, and others. Patients with severe allergies to penicillin medications are cautioned that there is about a 10% incidence of cross-reactivity with cephalosporins. When possible, patients with penicillin allergies should use alternatives to cephalosporins for antibiotic therapy.

## 2022-12-07 NOTE — ED PROVIDER NOTE - CLINICAL SUMMARY MEDICAL DECISION MAKING FREE TEXT BOX
Patient with dementia brought in by EMS from assisted living for abdominal pain.  Patient poor historian unable to provide reliable history.  Per family patient often complains of pain "all over" but because never found.  Family relates patient had normal BM last night and was her usual self last night.  No further history available.    Plan EKG chest x-ray CT abdomen pelvis labs IV fluid morphine differential including but not limited to MI pneumonia pneumothorax pleural effusion colitis appendicitis diverticulitis kidney stone UTI or other intra-abdominal infection

## 2022-12-07 NOTE — ED ADULT NURSE NOTE - OBJECTIVE STATEMENT
Patient brought in from [facility] [place] assisted living by EMS for generalized pain.  Patient was complaining of abdominal pain and left leg pain although is a poor historian.   states patient will often complain of pain "all over "and it is hard to discern where.  He states however this is slightly worse than prior.  Patient's last bowel movement was last night after dinner and was normal as per .  No falls. No fever. No vomiting or diarrhea. PMH of dementia and HTN.

## 2022-12-07 NOTE — ED PROVIDER NOTE - PHYSICAL EXAMINATION
PE:   GEN: Awake, alert, restless, interactive, NAD, non-toxic appearing.   HEAD: Atraumatic  EYES: Sclera white, conjunctiva pink, PERRL  NOSE: Patent, no epistaxis  MOUTH/THROAT: Patent, uvula midline, no tonsillar edema or erythema, no acute dental findings, no oral lesions or ulcerations, no Hoarse voice  CARDIAC: Reg rate and rhythm, S1,S2, no murmur/rub/gallop. Strong central and peripheral pulses, Brisk cap refill, no evident pedal edema.   RESP: No distress noted. L/S clear = Bilat without accessory muscle use, wheeze, rhonchi, rales.   ABD: soft, supple, TTP LOWER ABDOMEN no guarding. BS x 4, normoactive.   NEURO: Alert, confused CN II-XII grossly intact without focal deficit.   MSK: Moving all extremities with no apparent deformities. L leg nontedner no swelling   SKIN: Warm, dry, normal color, without apparent rashes.

## 2022-12-07 NOTE — ED PROVIDER NOTE - PROGRESS NOTE DETAILS
Reevaluated patient at bedside.  Patient feeling much improved.  Discussed the results of all diagnostic testing in ED and copies of all reports given to patients .   An opportunity to ask questions was given.  Discussed the importance of prompt, close medical follow-up.  Patient will return with any changes, concerns or persistent / worsening symptoms.  Understanding of all instructions verbalized.  does not want to wait any longer for transportation. As per alonzo clerk who set up transport, transport will not arrive until 1800. Transport will be cancelled and  to drive pt home.

## 2022-12-07 NOTE — ED PROVIDER NOTE - PATIENT PORTAL LINK FT
You can access the FollowMyHealth Patient Portal offered by [facility] by registering at the following website: [email or web address]. By joining [facility]’s FollowMyHealth portal, you will also be able to view your health information using other applications (apps) compatible with our system.

## 2022-12-07 NOTE — ED PROVIDER NOTE - NSFOLLOWUPINSTRUCTIONS_ED_ALL_ED_FT
Urinary Tract Infection    A urinary tract infection (UTI) is an infection of any part of the urinary tract, which includes the kidneys, ureters, bladder, and urethra. Risk factors include ignoring your need to urinate, wiping back to front if female, being an uncircumcised male, and having diabetes or a weak immune system. Symptoms include frequent urination, pain or burning with urination, foul smelling urine, cloudy urine, pain in the lower abdomen, blood in the urine, and fever. If you were prescribed an antibiotic medicine, take it as told by your health care provider. Do not stop taking the antibiotic even if you start to feel better.    SEEK IMMEDIATE MEDICAL CARE IF YOU HAVE ANY OF THE FOLLOWING SYMPTOMS: severe back or abdominal pain, fever, inability to keep fluids or medicine down, dizziness/lightheadedness, or a change in mental status.      1. TAKE ALL MEDICATIONS AS DIRECTED.  FOR PAIN YOU CAN TAKE IBUPROFEN (MOTRIN, ADVIL) OR ACETAMINOPHEN (TYLENOL) AS NEEDED, AS DIRECTED ON PACKAGING.  2. FOLLOW UP WITH ____GYNECOLOGY______ AS DIRECTED.  YOU WERE GIVEN COPIES OF ALL LABS AND IMAGING RESULTS FROM YOUR ER VISIT--PLEASE TAKE THEM WITH YOU TO YOUR APPOINTMENT.  3. IF NEEDED, CALL [phone] TO FIND A PRIMARY CARE PHYSICIAN.  OR CALL [phone] TO MAKE AN APPOINTMENT WITH THE MEDICAL CLINIC.  4. RETURN TO THE ER FOR ANY WORSENING SYMPTOMS.

## 2022-12-07 NOTE — ED PROVIDER NOTE - NS ED ATTENDING STATEMENT MOD
This was a shared visit with the MARRY. I reviewed and verified the documentation and independently performed the documented:

## 2022-12-09 LAB
CULTURE RESULTS: SIGNIFICANT CHANGE UP
SPECIMEN SOURCE: SIGNIFICANT CHANGE UP

## 2022-12-26 ENCOUNTER — EMERGENCY (EMERGENCY)
Facility: HOSPITAL | Age: 72
LOS: 1 days | Discharge: NURSING, REHAB OR LONG-TERM CARE | End: 2022-12-26
Attending: EMERGENCY MEDICINE | Admitting: EMERGENCY MEDICINE
Payer: MEDICARE

## 2022-12-26 VITALS
OXYGEN SATURATION: 97 % | HEART RATE: 62 BPM | RESPIRATION RATE: 16 BRPM | TEMPERATURE: 98 F | SYSTOLIC BLOOD PRESSURE: 123 MMHG | DIASTOLIC BLOOD PRESSURE: 74 MMHG

## 2022-12-26 VITALS
DIASTOLIC BLOOD PRESSURE: 79 MMHG | RESPIRATION RATE: 16 BRPM | OXYGEN SATURATION: 100 % | SYSTOLIC BLOOD PRESSURE: 125 MMHG | HEART RATE: 65 BPM | WEIGHT: 119.93 LBS | TEMPERATURE: 98 F | HEIGHT: 61 IN

## 2022-12-26 PROCEDURE — 72125 CT NECK SPINE W/O DYE: CPT | Mod: 26,MA

## 2022-12-26 PROCEDURE — 73552 X-RAY EXAM OF FEMUR 2/>: CPT

## 2022-12-26 PROCEDURE — 70450 CT HEAD/BRAIN W/O DYE: CPT | Mod: MA

## 2022-12-26 PROCEDURE — 71045 X-RAY EXAM CHEST 1 VIEW: CPT | Mod: 26

## 2022-12-26 PROCEDURE — 73522 X-RAY EXAM HIPS BI 3-4 VIEWS: CPT | Mod: 26

## 2022-12-26 PROCEDURE — 99284 EMERGENCY DEPT VISIT MOD MDM: CPT | Mod: 25

## 2022-12-26 PROCEDURE — 72125 CT NECK SPINE W/O DYE: CPT | Mod: MA

## 2022-12-26 PROCEDURE — 72192 CT PELVIS W/O DYE: CPT | Mod: MA

## 2022-12-26 PROCEDURE — 99284 EMERGENCY DEPT VISIT MOD MDM: CPT

## 2022-12-26 PROCEDURE — 73552 X-RAY EXAM OF FEMUR 2/>: CPT | Mod: 26,RT

## 2022-12-26 PROCEDURE — 73522 X-RAY EXAM HIPS BI 3-4 VIEWS: CPT

## 2022-12-26 PROCEDURE — 70450 CT HEAD/BRAIN W/O DYE: CPT | Mod: 26,MA

## 2022-12-26 PROCEDURE — 71045 X-RAY EXAM CHEST 1 VIEW: CPT

## 2022-12-26 PROCEDURE — 72192 CT PELVIS W/O DYE: CPT | Mod: 26,MA

## 2022-12-26 NOTE — ED PROVIDER NOTE - CLINICAL SUMMARY MEDICAL DECISION MAKING FREE TEXT BOX
fall/slip from bed, pt poor historian due to dementia, appears to have pain right hip - ct head/neck, xr hip/pelvis

## 2022-12-26 NOTE — ED PROVIDER NOTE - MUSCULOSKELETAL, MLM
no apparent spinal ttp, right hip appears tender and rotated internally, pt not following exam so limited

## 2022-12-26 NOTE — ED PROVIDER NOTE - NSFOLLOWUPINSTRUCTIONS_ED_ALL_ED_FT
Fall Prevention in the Home, Adult      Falls can cause injuries and affect people of all ages. There are many simple things that you can do to make your home safe and to help prevent falls. Ask for help when making these changes, if needed.      What actions can I take to prevent falls?    General instructions     •Use good lighting in all rooms. Replace any light bulbs that burn out, turn on lights if it is dark, and use night-lights.      •Place frequently used items in easy-to-reach places. Lower the shelves around your home if necessary.      •Set up furniture so that there are clear paths around it. Avoid moving your furniture around.      •Remove throw rugs and other tripping hazards from the floor.      •Avoid walking on wet floors.      •Fix any uneven floor surfaces.      •Add color or contrast paint or tape to grab bars and handrails in your home. Place contrasting color strips on the first and last steps of staircases.      •When you use a stepladder, make sure that it is completely opened and that the sides and supports are firmly locked. Have someone hold the ladder while you are using it. Do not climb a closed stepladder.      •Know where your pets are when moving through your home.        What can I do in the bathroom?                   •Keep the floor dry. Immediately clean up any water that is on the floor.      •Remove soap buildup in the tub or shower regularly.      •Use nonskid mats or decals on the floor of the tub or shower.      •Attach bath mats securely with double-sided, nonslip rug tape.      •If you need to sit down while you are in the shower, use a plastic, nonslip stool.      •Install grab bars by the toilet and in the tub and shower. Do not use towel bars as grab bars.      What can I do in the bedroom?     •Make sure that a bedside light is easy to reach.      • Do not use oversized bedding that reaches the floor.      •Have a firm chair that has side arms to use for getting dressed.      What can I do in the kitchen?     •Clean up any spills right away.      •If you need to reach for something above you, use a sturdy step stool that has a grab bar.      •Keep electrical cables out of the way.      • Do not use floor polish or wax that makes floors slippery. If you must use wax, make sure that it is non-skid floor wax.      What can I do with my stairs?     • Do not leave any items on the stairs.      •Make sure that you have a light switch at the top and the bottom of the stairs. Have them installed if you do not have them.      •Make sure that there are handrails on both sides of the stairs. Fix handrails that are broken or loose. Make sure that handrails are as long as the staircases.      •Install non-slip stair treads on all stairs in your home.      •Avoid having throw rugs at the top or bottom of stairs, or secure the rugs with carpet tape to prevent them from moving.      •Choose a carpet design that does not hide the edge of steps on the stairs.      •Check any carpeting to make sure that it is firmly attached to the stairs. Fix any carpet that is loose or worn.      What can I do on the outside of my home?     •Use bright outdoor lighting.      •Regularly repair the edges of walkways and driveways and fix any cracks.      •Remove high doorway thresholds.      •Trim any shrubbery on the main path into your home.      •Regularly check that handrails are securely fastened and in good repair. Both sides of all steps should have handrails.      •Install guardrails along the edges of any raised decks or porches.      •Clear walkways of debris and clutter, including tools and rocks.      •Have leaves, snow, and ice cleared regularly.      •Use sand or salt on walkways during winter months.      •In the garage, clean up any spills right away, including grease or oil spills.      What other actions can I take?     •Wear closed-toe shoes that fit well and support your feet. Wear shoes that have rubber soles or low heels.      •Use mobility aids as needed, such as canes, walkers, scooters, and crutches.      •Review your medicines with your health care provider. Some medicines can cause dizziness or changes in blood pressure, which increase your risk of falling.      Talk with your health care provider about other ways that you can decrease your risk of falls. This may include working with a physical therapist or  to improve your strength, balance, and endurance.      Where to find more information    •Centers for Disease Control and Prevention, [place]: www.cdc.gov      •[employer] [place] [employer] Aging: www.thad.nih.gov        Contact a health care provider if:    •You are afraid of falling at home.      •You feel weak, drowsy, or dizzy at home.      •You fall at home.        Summary    •There are many simple things that you can do to make your home safe and to help prevent falls.      •Ways to make your home safe include removing tripping hazards and installing grab bars in the bathroom.      •Ask for help when making these changes in your home.      This information is not intended to replace advice given to you by your health care provider. Make sure you discuss any questions you have with your health care provider.

## 2022-12-26 NOTE — ED ADULT NURSE NOTE - MODE OF DISCHARGE
General acute hospital, [place]    Discharge Summary  Transplant Surgery    Date of Admission:  8/7/2018  Date of Discharge:  8/10/2018  9:22 PM  Discharging Provider: Montse Mireles  Date of Service (when I saw the patient): 8/10/18    Discharge Diagnoses   Active Problems:    HTN (hypertension)    Kidney replaced by transplant    Kidney transplant recipient    Immunosuppression (H)      Procedure/Surgery Information   Procedure: Procedure(s):  Kidney Transplant Living Unrelated Non Directed Recipient  - Wound Class: II-Clean Contaminated   Surgeon(s): Surgeon(s) and Role:     * Bety Mitchell MD - Primary     * Kareem Lugo MD - Assisting     * Eli Price MD - Assisting     * Misti Lopez MD - Resident - Assisting         History of Present Illness   aHrshad Beatty is a 37 year old male who presented with a past medical history significant for 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    Hospital Course   S/p living donor kidney transplant: Cr improved from 14.6 pre-op to 3.24 on the day of discharge.  Making good urine output.  Peres was removed on day 3 and patient was voiding without retention.   Immunosuppression management: Did have some hypotension in the OR, thought to be secondary to thymo, so he was sent to the ICU for monitoring and first infusion of thymo was slowed and given over 24 hours.  Second dose of thymo was then given on POD 2, and he tolerated well, so third dose given over normal speed.  Completed three doses of 200 mg, for a total of 5.0 mg/kg.  MMF 1000 BID, Tacro 10 today (0.1mg/kg). Level prior to discharge was 9.6.  Prophylaxis with bactrim and valcyte (he was randomized to be on valtrex per study protocol, but was discharged on valcyte due to insurance coverage).  Hypertension: At baseline.  Did have brief hypotension post-op, thought to be secondary to thymo. Resolved quickly.   Restarted on coreg, increased to 50 mg BID and minoxidil 5 mg BID prior to discharge.   Hypoxia: Likely undiagnosed PRIETO. Sats well when awake, needs PAP at night  Mild steroid induced hyperglycemia: Resolved    Transplant coordinator: Blanca ZENG [phone]  DSA at time of transplant: No  Ureteral stent: No  CMV: Donor +  /Recipient +  EBV: Donor + / Recipient +  Thymoglobulin: 600 mg (5.0 mg/kg)    Post-operative pain control: Oxycodone and tylenol on discharge.     Discharge Disposition   Discharged to home   Condition at discharge: Stable    Primary Care Physician   [facility]    Physical Exam                      Vitals:    08/07/18 1247 08/10/18 1015   Weight: 120.1 kg (264 lb 12.4 oz) 117.2 kg (258 lb 4.8 oz)     Vital Signs with Ranges  Temp:  [97.8  F (36.6  C)] 97.8  F (36.6  C)  Pulse:  [78] 78  Heart Rate:  [77] 77  Resp:  [16] 16  BP: (150-160)/() 150/102  SpO2:  [99 %] 99 %       Constitutional: Awake, alert, cooperative, no apparent distress, and appears stated age.  Eyes: Lids and lashes normal, pupils equal, round, extra ocular muscles intact, sclera clear, conjunctiva normal.  ENT: Normocephalic, without obvious abnormality, atraumatic  Respiratory: No increased work of breathing, good air exchange, clear to auscultation bilaterally, no crackles or wheezing.  Cardiovascular: Normal apical impulse, regular rate and rhythm  GI: Soft, non-distended, non-tender, incision c/d/i, RONALD drain in place  Genitourinary:  Voiding  Skin: No bruising or bleeding, normal skin color, texture  Musculoskeletal: There is no redness, warmth, or swelling of the joints.  Full range of motion noted.  Tone is normal.  Neurologic: Awake, alert, oriented to name, place and time.     Neuropsychiatric: Calm, normal eye contact, alert, normal affect, oriented to self, place, time and situation, memory for past and recent events intact and thought process normal.    Consultations This Hospital Stay    SOCIAL WORK IP CONSULT  PHARMACY IP CONSULT  NUTRITION SERVICES ADULT IP CONSULT  NEPHROLOGY KIDNEY/PANCREAS TRANSPLANT ADULT IP CONSULT  PHYSICAL THERAPY ADULT IP CONSULT  OCCUPATIONAL THERAPY ADULT IP CONSULT    Time Spent on this Encounter   I have spent greater than 30 minutes on this discharge.    Discharge Orders   Discharge Medications   Discharge Medication List as of 8/10/2018  5:48 PM      START taking these medications    Details   acetaminophen (TYLENOL) 325 MG tablet Take 1-2 tablets (325-650 mg) by mouth every 4 hours as needed for mild pain or fever, Disp-100 tablet, R-0, E-Prescribe      aspirin 325 MG EC tablet Take 1 tablet (325 mg) by mouth daily, Disp-30 tablet, R-5, E-Prescribe      atorvastatin (LIPITOR) 10 MG tablet Take 1 tablet (10 mg) by mouth daily, Disp-30 tablet, R-11, E-Prescribe      cholecalciferol 2000 units tablet Take 2,000 Units by mouth daily, Disp-30 tablet, R-3, E-Prescribe      magnesium oxide (MAG-OX) 400 MG tablet Take 1 tablet (400 mg) by mouth daily (with lunch), Disp-30 tablet, R-3, E-Prescribe      mycophenolate (GENERIC EQUIVALENT) 250 MG capsule Take 4 capsules (1,000 mg) by mouth 2 times daily, Disp-240 capsule, R-11, E-Prescribe      ondansetron (ZOFRAN-ODT) 4 MG ODT tab Take 1 tablet (4 mg) by mouth every 6 hours as needed for nausea or vomiting, Disp-20 tablet, R-0, E-Prescribe      oxyCODONE IR (ROXICODONE) 5 MG tablet Take 1 tablet (5 mg) by mouth every 4 hours as needed for moderate to severe pain, Disp-20 tablet, R-0, Local Print      senna-docusate (SENOKOT-S;PERICOLACE) 8.6-50 MG per tablet Take 1 tablet by mouth 2 times daily, Disp-30 tablet, R-0, E-Prescribe      sulfamethoxazole-trimethoprim (BACTRIM/SEPTRA) 400-80 MG per tablet Take 1 tablet by mouth daily, Disp-30 tablet, R-11, E-Prescribe      tacrolimus (ENVARSUS XR) 1 MG 24 hr tablet Take 10 tablets (10 mg) by mouth every morning (before breakfast), Disp-300 tablet, R-11, E-PrescribePlease also fill  quantity 60 of 0.75 mg capsules to take by mouth twice daily as needed for dose titration      valGANciclovir (VALCYTE) 450 MG tablet Take 1 tablet (450 mg) by mouth daily Titrate dose up to a max of 2 tabs (900 mg) by mouth daily when directed by your transplant team., Disp-60 tablet, R-2, E-Prescribe         CONTINUE these medications which have CHANGED    Details   carvedilol (COREG) 25 MG tablet Take 2 tablets (50 mg) by mouth 2 times daily (with meals), Disp-120 tablet, R-3, E-Prescribe      minoxidil (LONITEN) 2.5 MG tablet Take 2 tablets (5 mg) by mouth 2 times daily, Disp-120 tablet, R-11, E-Prescribe         STOP taking these medications       amLODIPine (NORVASC) 10 MG tablet Comments:   Reason for Stopping:         calcium acetate (PHOSLO) 667 MG CAPS capsule Comments:   Reason for Stopping:         Cinacalcet HCl (SENSIPAR PO) Comments:   Reason for Stopping:         LISINOPRIL PO Comments:   Reason for Stopping:         valACYclovir (VALTREX) 500 MG tablet Comments:   Reason for Stopping:                 Home care nursing referral     Reason for your hospital stay   Patient underwent a living donor kidney transplant on 8/7/18.     [facility] Follow-up and recommended labs and tests   Over the next 3-5 days you will be [facility] in [facility] at 7 am (ph. [phone], option 7) .  Your labs will be drawn at the beginning of your appointment at 7:00 am.  DO NOT take your medications prior to having labs drawn. Please bring all your medications with you from home to take after labs are drawn.    LABS:  CBC, BMP, Mg, Phos and Tacrolimus levels (12 hours after administration) to be drawn daily while in [facility], then every Monday, Wednesday, Friday by home health care nurse if arranged, or at an outpatient lab.     FOLLOW UP APPOINTMENTS:  Remember to always bring an updated medication list to all appointments.     An appointment with your transplant surgeon will be scheduled for approximately  2 weeks following discharge from the hospital.  Your transplant surgeon is: Dr. Mitchell.  You will follow up with transplant nephrology in clinic at 1 and 3 months and then annually.     Follow up with primary care provider in 4-8 weeks. (Pt to schedule)    Call scheduling at [phone] if you have not heard about your appointments within 48 hours after discharge.  If you have staples in place, they will be removed in 3 weeks after operation.    WHEN TO CONTACT YOUR  COORDINATOR:  Transplant Coordinator [phone]  Notify your coordinator if you have pain over your kidney, increased redness or drainage from your incision, fever greater than 100.5F, or decreased urine output.  Notify your coordinator immediately if you are ever unable to take your immunosuppressive medications for any reason.  If it is outside of office hours, please call the hospital switchboard at [phone] and ask to have the kidney transplant surgery fellow paged for urgent medical questions, or present to the emergency department.    OTHER DISCHARGE INSTRUCTIONS:  RONALD plan: Please monitor color and amount of output. Drain will remain in place until output is <30cc/day x 3 days.     Monitor blood pressure and weight daily initially post transplant.    Walk at least four times a day, lift no greater than 10 pounds for 6-8 weeks from the time of surgery.  No driving while taking narcotics or 3 weeks after surgery.    Diet recommendations post-transplant: Heart healthy dietary habits long term (low saturated/trans fat, low sodium). High protein diet x 8 weeks. Practice food safety precautions.     Discharge Instructions   On 2/8/19 (after 6 months), decrease aspirin dose from 325 mg daily to 81 mg by mouth daily     Diet   Follow this diet upon discharge:  Regular Diet Adult       Data   Most Recent 3 Creatinines:  Recent Labs   Lab Test  08/12/18   0720  08/10/18   0654  08/09/18   0306   CR  2.28*  3.24*  5.73*     Most Recent 3  Hemoglobins:  Recent Labs   Lab Test  08/13/18   0755  08/12/18   0720  08/10/18   0654   HGB  12.0*  11.6*  10.8*     I have reviewed history, examined patient and discussed plan with the fellow/resident/CONCHITA.    I concur with the findings in this note.    Time spent on discharge activities: 45 minutes.              Stretcher

## 2022-12-26 NOTE — ED PROVIDER NOTE - OBJECTIVE STATEMENT
72 y.o. F BIBEMS from dementia unit, fell/slid out of bed ( has camera), pt very poor historian per 72 y.o. F BIBEMS from dementia unit, fell/slid out of bed ( has camera), pt very poor historian, per  has a video that shows her slipping from the bed to the floor, did not appear very traumatic to  from video

## 2022-12-26 NOTE — ED ADULT NURSE NOTE - NSICDXPASTMEDICALHX_GEN_ALL_CORE_FT
PAST MEDICAL HISTORY:  Balance disorder     Bladder incontinence     Dementia     HTN (hypertension)     Memory changes

## 2022-12-26 NOTE — ED PROVIDER NOTE - PATIENT PORTAL LINK FT
You can access the FollowMyHealth Patient Portal offered by [facility] by registering at the following website: [email or web address]. By joining [employer]’s FollowMyHealth portal, you will also be able to view your health information using other applications (apps) compatible with our system.

## 2022-12-26 NOTE — ED ADULT NURSE NOTE - OBJECTIVE STATEMENT
Pt brought to the ED s/p slip out of bed. Pt found on the floor by staff during room check. Pt is confused and having inappropriate conversation, unable to communicate events of fall, no pain at rest but pain with movement and palpation of right hip and lower back.  at the bedside.

## 2022-12-27 ENCOUNTER — NON-APPOINTMENT (OUTPATIENT)
Age: 72
End: 2022-12-27

## 2022-12-27 RX ORDER — MEMANTINE HYDROCHLORIDE 14 MG/1
14 CAPSULE, EXTENDED RELEASE ORAL
Qty: 90 | Refills: 3 | Status: DISCONTINUED | COMMUNITY
Start: 2021-02-22 | End: 2022-12-27

## 2023-01-24 ENCOUNTER — EMERGENCY (EMERGENCY)
Facility: HOSPITAL | Age: 73
LOS: 1 days | Discharge: NURSING, REHAB OR LONG-TERM CARE | End: 2023-01-24
Attending: EMERGENCY MEDICINE | Admitting: EMERGENCY MEDICINE
Payer: MEDICARE

## 2023-01-24 VITALS
HEART RATE: 82 BPM | OXYGEN SATURATION: 98 % | HEIGHT: 61 IN | RESPIRATION RATE: 18 BRPM | SYSTOLIC BLOOD PRESSURE: 158 MMHG | WEIGHT: 130.07 LBS | TEMPERATURE: 98 F | DIASTOLIC BLOOD PRESSURE: 80 MMHG

## 2023-01-24 PROCEDURE — 99284 EMERGENCY DEPT VISIT MOD MDM: CPT | Mod: FS

## 2023-01-24 PROCEDURE — 99282 EMERGENCY DEPT VISIT SF MDM: CPT

## 2023-01-24 RX ORDER — DONEPEZIL HYDROCHLORIDE 10 MG/1
0 TABLET, FILM COATED ORAL
Qty: 0 | Refills: 0 | DISCHARGE

## 2023-01-24 RX ORDER — DILTIAZEM HCL 120 MG
0 CAPSULE, EXT RELEASE 24 HR ORAL
Qty: 0 | Refills: 0 | DISCHARGE

## 2023-01-24 RX ORDER — TROSPIUM CHLORIDE 20 MG/1
0 TABLET, FILM COATED ORAL
Qty: 0 | Refills: 0 | DISCHARGE

## 2023-01-24 RX ORDER — ESCITALOPRAM OXALATE 10 MG/1
0 TABLET, FILM COATED ORAL
Qty: 0 | Refills: 0 | DISCHARGE

## 2023-01-24 RX ORDER — ASPIRIN/CALCIUM CARB/MAGNESIUM 324 MG
0 TABLET ORAL
Qty: 0 | Refills: 0 | DISCHARGE

## 2023-01-24 NOTE — ED PROVIDER NOTE - OBJECTIVE STATEMENT
72-year-old female with history of dementia and hypertension brought in by ambulance for evaluation after fall prior to arrival.  Patient has unsteady gait and has frequent falls per .  Initially complained of left hip pain after the fall.  Per EMS, on their arrival did not have any complaints and was ambulatory at baseline.   does not believe patient hit her head.  Does not take any blood thinners.  No signs of trauma and head or face.  At normal neuro baseline per   PCP Kj Ramirez  Resident at [facility]

## 2023-01-24 NOTE — ED PROVIDER NOTE - CARE PROVIDER_API CALL
Elliot Ramirez  INTERNAL MEDICINE  [address] [place] [address]  Phone: [phone]  Fax: [phone]  Follow Up Time: 1-3 Days

## 2023-01-24 NOTE — ED PROVIDER NOTE - AGGRAVATING FACTORS
Assessment and Plan:    Problem List Items Addressed This Visit     None      Visit Diagnoses     Medicare annual wellness visit, subsequent    -  Primary        Health Maintenance Due   Topic Date Due    Fall Risk  06/20/2012    Urinary Incontinence Screening  06/20/2012    Pneumococcal PPSV23/PCV13 65+ Years / Low and Medium Risk (1 of 2 - PCV13) 06/20/2012    INFLUENZA VACCINE  07/01/2018    MAMMOGRAM  12/01/2018         HPI:  Lucille Closs is a 69 y o  female here for her Subsequent Wellness Visit      Patient Active Problem List   Diagnosis    Chalazion of left upper eyelid    Disorder of bone and cartilage    Dyslipidemia    Ectatic abdominal aorta (HCC)    Benign essential hypertension    Hyperthyroidism    Nontoxic single thyroid nodule    Osteopenia    Asymptomatic varicose veins    Vitamin D deficiency     Past Medical History:   Diagnosis Date    Disease of thyroid gland     hyper    Eustachian tube dysfunction     Hyperkalemia     Hypertension      Past Surgical History:   Procedure Laterality Date    CARPAL TUNNEL RELEASE Bilateral     VARICOSE VEIN SURGERY      ligation     Family History   Problem Relation Age of Onset    Cancer Mother         malignant pleural mesothelioma    Hypertension Father     Hypertension Brother     Colon cancer Paternal Grandfather 48     History   Smoking Status    Never Smoker   Smokeless Tobacco    Never Used     History   Alcohol Use No     Comment: rarely      History   Drug Use No       Current Outpatient Prescriptions   Medication Sig Dispense Refill    aspirin (ECOTRIN LOW STRENGTH) 81 mg EC tablet Take 81 mg by mouth daily      atorvastatin (LIPITOR) 10 mg tablet Take 1 tablet (10 mg total) by mouth daily 90 tablet 3    Biotin 1000 MCG tablet Take 1,000 mcg by mouth 3 (three) times a day      Cholecalciferol 2000 units CAPS Take 1 capsule by mouth      Coenzyme Q10 (COQ10) 100 MG CAPS Take by mouth      lisinopril (ZESTRIL) 20 mg tablet Take 1 tablet (20 mg total) by mouth 2 (two) times a day 180 tablet 3    Magnesium 100 MG CAPS Take by mouth      methimazole (TAPAZOLE) 5 mg tablet Take 5 mg by mouth      Multiple Vitamins-Minerals (MULTIVITAMIN WITH MINERALS) tablet Take 1 tablet by mouth daily       No current facility-administered medications for this visit  No Known Allergies  Immunization History   Administered Date(s) Administered    Influenza 10/15/2015    Tdap 09/05/2014       Patient Care Team:  Nando Adhikari DO as PCP - General    Medicare Screening Tests and Risk Assessments:  Tenisha Phillips is here for her Subsequent Wellness visit  Health Risk Assessment:  Patient rates overall health as very good  Patient feels that their physical health rating is Same  Eyesight was rated as Same  Hearing was rated as Same  Patient feels that their emotional and mental health rating is Same  Pain experienced by patient in the last 7 days has been None  Emotional/Mental Health:  Patient has been feeling nervous/anxious  PHQ-9 Depression Screening:    Frequency of the following problems over the past two weeks:      1  Little interest or pleasure in doing things: 0 - not at all      2  Feeling down, depressed, or hopeless: 0 - not at all  PHQ-2 Score: 0          Broken Bones/Falls: Fall Risk Assessment:    In the past year, patient has experienced: No history of falling in past year          Bladder/Bowel:  Patient has not leaked urine accidently in the last six months  Patient reports no loss of bowel control  Immunizations:  Patient has not had a flu vaccination within the last year  Patient has not received a pneumonia shot  Patient has not received a shingles shot  Patient has not received tetanus/diphtheria shot  Home Safety:  Patient does not have trouble with stairs inside or outside of their home     Patient currently reports that there are no safety hazards present in home, working smoke alarms, working carbon monoxide detectors  Preventative Screenings:   Breast cancer screening performed, colon cancer screen completed, cholesterol screen completed, glaucoma eye exam completed,     Nutrition:  Current diet: Regular with servings of the following:    Medications:  Patient is currently taking over-the-counter supplements  Patient is able to manage medications  Lifestyle Choices:  Patient reports no tobacco use  Patient has not smoked or used tobacco in the past   Patient reports no alcohol use  Patient drives a vehicle  Patient wears seat belt  Current level of exercise of physical activity described by patient as: walks 6-8 miles of walking  Activities of Daily Living:  Can get out of bed by his or her self, able to dress self, able to make own meals, able to do own shopping, able to bathe self, can do own laundry/housekeeping, can manage own money, pay bills and track expenses    Previous Hospitalizations:  No hospitalization or ED visit in past 12 months        Advanced Directives:  Patient has decided on a power of   Patient has spoken to designated power of   Patient has completed advanced directive  Preventative Screening/Counseling:      Cardiovascular:      General: Screening Current          Diabetes:      General: Screening Current          Colorectal Cancer:      General: Screening Current          Breast Cancer:      General: Screening Current          Cervical Cancer:      General: Screening Current          Osteoporosis:      General: Screening Current          AAA:      General: Patient Declines          Glaucoma:      General: Screening Current          HIV:      General: Screening Not Indicated          Hepatitis C:      General: Screening Not Indicated        Advanced Directives:   Patient has living will for healthcare, has durable POA for healthcare, patient has an advanced directive       Immunizations:      Influenza: Patient Declines      Pneumococcal: Patient Declines      Shingrix: Patient Declines      Hepatitis B (Medium to high risk patients): Patient Declines      Zostavax: Patient Declines      TDAP: Patient Declines      Other Preventative Counseling (Non-Medicare):   Increase physical activity      Referrals:  Referral(s) to: Endocrinology none

## 2023-01-24 NOTE — ED PROVIDER NOTE - CARE PLAN
1 Principal Discharge DX:	Fall  Secondary Diagnosis:	Dementia  Secondary Diagnosis:	Contusion of right hip

## 2023-01-24 NOTE — ED PROVIDER NOTE - PROGRESS NOTE DETAILS
Patient stable.  No extremity tenderness on palpation.  No deformities.  Full range of motion.  Ambulatory without limp.  Do not suspect hip fracture.  Offered x-ray which  declines due to low suspicion.  States he would like to discharge her soon as possible and take her back as she is more comfortable in her regular surroundings.  Due to dementia, unable to provide details of the fall.   does not believe she hit her head.  Does not take any blood thinners.  No signs of head or facial trauma.  Offered CT of head and neck which  also declines.

## 2023-01-24 NOTE — ED PROVIDER NOTE - NSFOLLOWUPINSTRUCTIONS_ED_ALL_ED_FT
ice  tylenol over the counter as needed  always walk with assistance         Contusion      A contusion is a deep bruise. This is a result of an injury that causes bleeding under the skin. Symptoms of bruising include pain, swelling, and discolored skin. The skin may turn blue, purple, or yellow.      Follow these instructions at home:      Managing pain, stiffness, and swelling   Bag of ice on a towel on the skin.   You may use RICE. This stands for:  •Resting.      •Icing.      •Compression, or putting pressure.      •Elevating, or raising the injured area.      To follow this method, do these actions:  •Rest the injured area.    •If told, put ice on the injured area.  •Put ice in a plastic bag.      •Place a towel between your skin and the bag.      •Leave the ice on for 20 minutes, 2–3 times per day.        •If told, put light pressure (compression) on the injured area using an elastic bandage. Make sure the bandage is not too tight. If the area tingles or becomes numb, remove it and put it back on as told by your doctor.      •If possible, raise (elevate) the injured area above the level of your heart while you are sitting or lying down.      General instructions     •Take over-the-counter and prescription medicines only as told by your doctor.      •Keep all follow-up visits as told by your doctor. This is important.        Contact a doctor if:    •Your symptoms do not get better after several days of treatment.      •Your symptoms get worse.      •You have trouble moving the injured area.        Get help right away if:    •You have very bad pain.      •You have a loss of feeling (numbness) in a hand or foot.      •Your hand or foot turns pale or cold.        Summary    •A contusion is a deep bruise. This is a result of an injury that causes bleeding under the skin.      •Symptoms of bruising include pain, swelling, and discolored skin. The skin may turn blue, purple, or yellow.      •This condition is treated with rest, ice, compression, and elevation. This is also called RICE. You may be given over-the-counter medicines for pain.      •Contact a doctor if you do not feel better, or you feel worse. Get help right away if you have very bad pain, have lost feeling in a hand or foot, or the area turns pale or cold.      This information is not intended to replace advice given to you by your health care provider. Make sure you discuss any questions you have with your health care provider.

## 2023-01-24 NOTE — ED PROVIDER NOTE - PRINCIPAL DIAGNOSIS
Fall 73M w T2DM, DLD, alcohol misuse brought in by family after having been found face down on the floor for approximately 2 days. On treatment for UTI, but now JANES with Aortic Valve lesions. Initial suspected culture negative endocarditis, subsequently had valve failure, so had AVR on 1/30/18; PCR confirmed GBS. Completed treatment on 3/1 with Ceftriaxone. Patient had episode of sternal ? discharge, underwent wound exploration on 2/11--cultures negative--thought to be hematoma, noninfected. Patient was doing well and planned for discharge, but then team noticed increasing swelling at mid-sternal area. Again I&D'd with hematoma.     Suggest:  - continue to observe off antibiotics  - d/c planning    Please call the ID service [phone] with questions or concerns over the weekend

## 2023-01-24 NOTE — ED PROVIDER NOTE - CLINICAL SUMMARY MEDICAL DECISION MAKING FREE TEXT BOX
72-year-old female with history of dementia and hypertension brought in by ambulance for evaluation after fall prior to arrival.  Patient has unsteady gait and has frequent falls per .  Initially complained of left hip pain after the fall.  Per EMS, on their arrival did not have any complaints and was ambulatory at baseline.   does not believe patient hit her head.  Does not take any blood thinners.  No signs of trauma and head or face.  At normal neuro baseline per   PCP Kj Ramirez  Resident at [facility]  VSS Afebrile, NAD  HEENT - clear, AT, NT  PERRL EOMI  Neck supple, NT  lungs clear  Cor S1S2 RR - MGR  Abd soft nontender, no mass or HSM, no rebound  Ext FROM intact, no edema, AT, NT  Neuro Confused, otherwise Intact, no deficits. Gait normal.  Skin Warm and dry no rash.  Imp- SP fall, dementia. No obvious injury. Ambulating in ED without difficulty.  present in ED and refused CT or Xray. States she is fine.  Plan - DC with fall instructions. Return to ED as needed.    I performed a history and physical exam of the patient and discussed their management with the advanced care provider. I reviewed the advanced care provider's note and agree with the documented findings and plan of care. My medical decision making and objective findings are found above.

## 2023-01-24 NOTE — ED PROVIDER NOTE - MUSCULOSKELETAL, MLM
no vert tenderness or step off deformities. no ext tenderness. no deformities. full ROM of all ext. ambulatory without limp (with assistance, at baseline)

## 2023-01-24 NOTE — ED PROVIDER NOTE - PATIENT PORTAL LINK FT
You can access the FollowMyHealth Patient Portal offered by [facility] by registering at the following website: [email or web address]. By joining [employer]’s FollowMyHealth portal, you will also be able to view your health information using other applications (apps) compatible with our system.

## 2023-01-25 PROBLEM — F03.90 UNSPECIFIED DEMENTIA, UNSPECIFIED SEVERITY, WITHOUT BEHAVIORAL DISTURBANCE, PSYCHOTIC DISTURBANCE, MOOD DISTURBANCE, AND ANXIETY: Chronic | Status: ACTIVE | Noted: 2022-12-26

## 2023-02-18 ENCOUNTER — EMERGENCY (EMERGENCY)
Facility: HOSPITAL | Age: 73
LOS: 1 days | Discharge: HOME | End: 2023-02-18
Attending: EMERGENCY MEDICINE | Admitting: EMERGENCY MEDICINE
Payer: MEDICARE

## 2023-02-18 VITALS
RESPIRATION RATE: 20 BRPM | OXYGEN SATURATION: 97 % | TEMPERATURE: 98 F | DIASTOLIC BLOOD PRESSURE: 68 MMHG | SYSTOLIC BLOOD PRESSURE: 150 MMHG | HEIGHT: 61 IN | HEART RATE: 85 BPM | WEIGHT: 115.08 LBS

## 2023-02-18 PROCEDURE — 72125 CT NECK SPINE W/O DYE: CPT | Mod: 26,MA

## 2023-02-18 PROCEDURE — 70450 CT HEAD/BRAIN W/O DYE: CPT | Mod: 26,MA

## 2023-02-18 PROCEDURE — 70450 CT HEAD/BRAIN W/O DYE: CPT | Mod: MA

## 2023-02-18 PROCEDURE — 72125 CT NECK SPINE W/O DYE: CPT | Mod: MA

## 2023-02-18 PROCEDURE — 99284 EMERGENCY DEPT VISIT MOD MDM: CPT | Mod: FS

## 2023-02-18 PROCEDURE — 99284 EMERGENCY DEPT VISIT MOD MDM: CPT | Mod: 25

## 2023-02-18 RX ORDER — BUPROPION HYDROCHLORIDE 150 MG/1
1 TABLET, EXTENDED RELEASE ORAL
Qty: 0 | Refills: 0 | DISCHARGE

## 2023-02-18 RX ORDER — DONEPEZIL HYDROCHLORIDE 10 MG/1
1 TABLET, FILM COATED ORAL
Qty: 0 | Refills: 0 | DISCHARGE

## 2023-02-18 RX ORDER — SENNA PLUS 8.6 MG/1
2 TABLET ORAL
Qty: 0 | Refills: 0 | DISCHARGE

## 2023-02-18 RX ORDER — ASCORBIC ACID 60 MG
1 TABLET,CHEWABLE ORAL
Qty: 0 | Refills: 0 | DISCHARGE

## 2023-02-18 RX ORDER — MEMANTINE HYDROCHLORIDE 10 MG/1
1 TABLET ORAL
Qty: 0 | Refills: 0 | DISCHARGE

## 2023-02-18 RX ORDER — CHOLECALCIFEROL (VITAMIN D3) 125 MCG
1 CAPSULE ORAL
Qty: 0 | Refills: 0 | DISCHARGE

## 2023-02-18 RX ORDER — ESCITALOPRAM OXALATE 10 MG/1
1 TABLET, FILM COATED ORAL
Qty: 0 | Refills: 0 | DISCHARGE

## 2023-02-18 RX ORDER — METHENAMINE MANDELATE 1 G
1 TABLET ORAL
Qty: 0 | Refills: 0 | DISCHARGE

## 2023-02-18 RX ORDER — ACETAMINOPHEN 500 MG
2 TABLET ORAL
Qty: 0 | Refills: 0 | DISCHARGE

## 2023-02-18 RX ORDER — POLYETHYLENE GLYCOL 3350 17 G/17G
17 POWDER, FOR SOLUTION ORAL
Qty: 0 | Refills: 0 | DISCHARGE

## 2023-02-18 RX ORDER — DILTIAZEM HCL 120 MG
1 CAPSULE, EXT RELEASE 24 HR ORAL
Qty: 0 | Refills: 0 | DISCHARGE

## 2023-02-18 NOTE — ED ADULT NURSE NOTE - BRADEN SCORE (IF 18 OR LESS ACTIVATE SKIN INJURY RISK INCREASED GUIDELINE), MLM
7/30:  Labs stable.  cxr results pending- discussed with radiology reading.  Report similar to 5/20 cxr (prior to thoracentesis).  IR staff looking at scheduling to see if they can accommodate pt today.  Awaiting input.    IR order placed for therapeutic and diagnostic thoracentesis   Spoke to patient.  Instructed her to stop taking her bp's so often.  She should only be taking it once or twice a day or if she is feeling symptomatic.  Patient states that is why she took it so frequently because she felt dizzy.  Currently, the dizziness has subsided.  Encouraged to pay attention to staying hydrated.  She states that she 6-8 12 oz bottles of water daily.  She denies wearing compression socks.  Encouraged patient to purchase a pair to wear during the day off at night.  She is lay on her left side when lying down. If her symptoms get worse, or if her bp is below 100/60 she is to call the office to be triaged.  We are available 24/7.  Patient verblaized understanding and is agreeable.   21

## 2023-02-18 NOTE — ED ADULT NURSE NOTE - OBJECTIVE STATEMENT
73 y/o female received aox1 via stretcher bibems for fall eval. spoke to staff member from her facility who stated that the pt sustained a witnessed fall today when she got up from a wheel chair and fell forward hitting her head. no LOC. pt has severe dementia, does not follow verbal commands and continuously attempts to get out of bed. pt denies pain but is noted to be guarding left arm/elbow. no deformities, open wounds, bleeding noted. staff member reports that pt has been increasingly confused and attempts to get up from chairs/wheelchairs.

## 2023-02-18 NOTE — ED PROVIDER NOTE - CLINICAL SUMMARY MEDICAL DECISION MAKING FREE TEXT BOX
72-year-old female with severe dementia brought by ambulance from the [facility] after a fall today out of wheelchair.  Patient unable to give any history.   who is present at bedside states he is unsure what happened.  States patient is in her usual state of health and has no acute injury or complaint.  Physical exam vital signs stable afebrile no distress.  Head is atraumatic nontender scalp nondeformed.  Neck is supple full range of motion intact nontender.  Pupils equal round reactive to light extraocular muscles intact.  Heart and lungs normal abdomen soft nontender.  Extremities full range of motion intact atraumatic.  Neuro awake and alert confused no focal deficits.  Impression is fall head injury rule out fracture rule out intracranial hemorrhage.  Plan is CT brain and C-spine.  If negative return to assisted living with head injury instructions.

## 2023-02-18 NOTE — ED PROVIDER NOTE - OBJECTIVE STATEMENT
72-year-old female with history of dementia, balance disorder, hypertension brought in by ambulance from the [facility] for evaluation of head injury status post fall today.  As per EMS, patient had mechanical fall from the wheelchair, fell forward hitting head that was witnessed by staff and sent to ED for evaluation and imaging.  No LOC or use of blood thinners as per EMS.  As per  at bedside, patient is at her baseline mental status and has no acute complaints or injury and is unsure what happened. Unable to obtain any information from pt secondary to dementia.

## 2023-02-18 NOTE — ED PROVIDER NOTE - MUSCULOSKELETAL, MLM
range of motion is not limited, no muscle or joint tenderness, no C/T/L spine tenderness or ecchymosis noted, BUE and BLE NT with FROM

## 2023-03-09 ENCOUNTER — EMERGENCY (EMERGENCY)
Facility: HOSPITAL | Age: 73
LOS: 1 days | Discharge: HOME | End: 2023-03-09
Attending: EMERGENCY MEDICINE | Admitting: EMERGENCY MEDICINE
Payer: MEDICARE

## 2023-03-09 VITALS
TEMPERATURE: 98 F | HEART RATE: 99 BPM | SYSTOLIC BLOOD PRESSURE: 129 MMHG | RESPIRATION RATE: 14 BRPM | WEIGHT: 119.93 LBS | OXYGEN SATURATION: 96 % | DIASTOLIC BLOOD PRESSURE: 86 MMHG | HEIGHT: 61 IN

## 2023-03-09 PROCEDURE — 72192 CT PELVIS W/O DYE: CPT | Mod: MA

## 2023-03-09 PROCEDURE — 99284 EMERGENCY DEPT VISIT MOD MDM: CPT | Mod: FS

## 2023-03-09 PROCEDURE — 72125 CT NECK SPINE W/O DYE: CPT | Mod: 26,MA

## 2023-03-09 PROCEDURE — 72125 CT NECK SPINE W/O DYE: CPT | Mod: MA

## 2023-03-09 PROCEDURE — 73502 X-RAY EXAM HIP UNI 2-3 VIEWS: CPT | Mod: 26,RT

## 2023-03-09 PROCEDURE — 70450 CT HEAD/BRAIN W/O DYE: CPT | Mod: MA

## 2023-03-09 PROCEDURE — 72192 CT PELVIS W/O DYE: CPT | Mod: 26,MA

## 2023-03-09 PROCEDURE — 70450 CT HEAD/BRAIN W/O DYE: CPT | Mod: 26,MA

## 2023-03-09 PROCEDURE — 71045 X-RAY EXAM CHEST 1 VIEW: CPT | Mod: 26

## 2023-03-09 PROCEDURE — 73502 X-RAY EXAM HIP UNI 2-3 VIEWS: CPT

## 2023-03-09 PROCEDURE — 72100 X-RAY EXAM L-S SPINE 2/3 VWS: CPT | Mod: 26

## 2023-03-09 PROCEDURE — 71045 X-RAY EXAM CHEST 1 VIEW: CPT

## 2023-03-09 PROCEDURE — 99284 EMERGENCY DEPT VISIT MOD MDM: CPT | Mod: 25

## 2023-03-09 PROCEDURE — 72100 X-RAY EXAM L-S SPINE 2/3 VWS: CPT

## 2023-03-09 NOTE — ED PROVIDER NOTE - DIFFERENTIAL DIAGNOSIS
Patient presenting to the emergency room status post reported fall.  At baseline.  As it is unclear if there was head injury will obtain CT imaging of the head cervical spine and pelvis.  Will x-ray hip and chest.   at bedside also in agreement with imaging wants to do so any additional work-up as he reports his wife has advanced dementia will like limited medical intervention. Differential Diagnosis

## 2023-03-09 NOTE — ED PROVIDER NOTE - CLINICAL SUMMARY MEDICAL DECISION MAKING FREE TEXT BOX
Patient is a 22-year-old female presents to the emergency room status post fall.  Past medical history of hypertension and dementia.  Patient presents via ambulance from the [facility] secondary to a fall.  Per EMS patient was a witnessed fall out of the wheelchair.  At baseline patient has advanced dementia and can provide no history.  The son was contacted advised the patient was in a wheelchair he slid out they heard a thump.  They were uncertain if she hit her head.  Is been no episodes of vomiting patient otherwise appears to be at her baseline.  She is not on anticoagulants.  On exam patient is laying in bed awake will answer questions  at bedside reports this is her baseline.  Pupils equal round and reactive heart is regular rate lungs are clear to auscultation abdomen soft nontender nondistended.  There is no midline C-T-L tenderness to palpation.  There was mild tenderness to palpation to the right hip on palpation.  No additional joint tenderness to palpation.  Patient presenting to the emergency room status post reported fall.  At baseline.  As it is unclear if there was head injury will obtain CT imaging of the head cervical spine and pelvis.  Will x-ray hip and chest.   at bedside also in agreement with imaging wants to do so any additional work-up as he reports his wife has advanced dementia will like limited medical intervention.  CT imaging noted chronic microvascular changes noted no acute hemorrhage.  There is reversal of the normal cervical curve.  No acute fracture degenerative changes noted.  CT imaging of the pelvis reveals no acute displaced fracture.  Stable area of sclerosis within the S2 vertebral body has been made aware of this.  X-ray of the lumbar spine reveals degenerative changes with sclerotic deformity of L2  made aware of this mild sclerotic deformity of L1  also made aware of this..  Independent review of chest x-ray reveals no acute infiltrate.  Independent review of hip x-ray reveals no acute fracture.

## 2023-03-09 NOTE — ED PROVIDER NOTE - CARE PROVIDER_API CALL
Aric Moya)  Orthopedics  [address]  [place], [place] [id number]  Phone: [phone]  Fax: [phone]  Follow Up Time: 1-3 Days

## 2023-03-09 NOTE — ED PROVIDER NOTE - PHYSICAL EXAMINATION
Constitutional: Awake, Alert, non-toxic. NAD. Well appearing, well nourished.   HEAD: Normocephalic, atraumatic.   EYES: PERRL, EOM intact, conjunctiva and sclera are clear bilaterally. No raccoon eyes.   ENT: No augustine sign. No rhinorrhea, normal pharynx, patent, mucous membranes pink/moist  NECK: Supple, non-tender  BACK: No midline or paraspinal TTP of cervical/thoracic/lumbar spine, FROM. No ecchymosis or hematomas. no rib TTP.   CARDIOVASCULAR: Normal S1, S2; regular rate and rhythm.  RESPIRATORY: Normal respiratory effort; breath sounds CTAB, no wheezes, rhonchi, or rales. Speaking in full sentences. No accessory muscle use.   ABDOMEN: Soft; non-tender, non-distended  EXTREMITIES: (+) mild discomfort to right hip, passive ROM intact. LE and UE non-tender to palpation; distal pulses palpable and symmetric, no edema, no crepitus or step off  SKIN: Warm, dry; good skin turgor, no apparent lesions or rashes, no ecchymosis, brisk capillary refill.  NEURO: A&O x1. not following commands, CN 2-12 intact.

## 2023-03-09 NOTE — ED PROVIDER NOTE - PROGRESS NOTE DETAILS
pt  requesting discharge and reports he will bring patient home. All questions were answered. Discussed the importance of prompt, close medical follow-up. Patient will return with any changes, concerns or persistent/worsening symptoms.  Patient verbalized understanding.

## 2023-03-09 NOTE — ED PROVIDER NOTE - NSFOLLOWUPINSTRUCTIONS_ED_ALL_ED_FT
1) Follow-up with Orthopedics, See referred doctor. Call today/next business day for close, prompt follow-up.  2) Return to Emergency room for any worsening or persistent pain, weakness, numbness, fever, color change to extremity, or any other concerning symptoms.  3) Take Tylenol as needed.   4) You can consider discussing with your doctor if physical therapy or further imaging as an MRI may be beneficial.       Fall Prevention    WHAT YOU NEED TO KNOW:    What is fall prevention? Fall prevention includes ways to make your home and other areas safer. Prevention also includes ways you can move more carefully to prevent a fall.    What increases my risk for falls?   •Lack of vitamin D      •Not getting enough sleep each night      •Trouble walking or keeping your balance, or foot problems      •Health conditions that cause changes in your blood pressure, vision, or muscle strength and coordination      •Medicines that make you dizzy, weak, or sleepy      •Problems seeing clearly      •Shoes that have high heels or are not supportive      •Tripping hazards, such as items left on the floor, no handrails on the stairs, or broken steps      How can I help protect myself from falls?   •Stand or sit up slowly. This may help you keep your balance and prevent falls. If you need to get up during the night, sit up first. Be sure you are fully awake before you stand. Turn on the light before you start walking. Go slowly in case you are still sleepy. Make sure you will not trip over any pets sleeping in the bedroom.      •Use assistive devices as directed. Your healthcare provider may suggest that you use a cane or walker to help you keep your balance. You may need to have grab bars put in your bathroom near the toilet or in the shower.      •Wear shoes that fit well and have soles that . Wear shoes both inside and outside. Use slippers with good . Do not wear shoes with high heels.      •Stay active. Exercise can help strengthen your muscles and improve your balance. Your healthcare provider may recommend water aerobics or walking. He or she may also recommend physical therapy to improve your coordination. Never start an exercise program without talking to your healthcare provider first.  Black Family Walking for Exercise           •Manage medical conditions. Keep all appointments with your healthcare providers. Visit your eye doctor as directed.      How can I make my home safer?   Fall Prevention for Adults       •Add items to prevent falls in the bathroom. Put nonslip strips on your bath or shower floor to prevent you from slipping. Use a bath mat if you do not have carpet in the bathroom. This will prevent you from falling when you step out of the bath or shower. Use a shower seat so you do not need to stand while you shower. Sit on the toilet or a chair in your bathroom to dry yourself and put on clothing. This will prevent you from losing your balance from drying or dressing yourself while you are standing.      •Keep paths clear. Remove books, shoes, and other objects from walkways and stairs. Place cords for telephones and lamps out of the way so that you do not need to walk over them. Tape them down if you cannot move them. Remove small rugs. If you cannot remove a rug, secure it with double-sided tape. This will prevent you from tripping.      •Install bright lights in your home. Use night lights to help light paths to the bathroom or kitchen. Always turn on the light before you start walking.      •Keep items you use often on shelves within reach. Do not use a step stool to help you reach an item.      •Paint or place reflective tape on the edges of your stairs. This will help you see the stairs better.      How do I plan ahead in case I do fall? Talk with family members, friends, and neighbors to create a fall plan. Someone will need to call for emergency help if you are injured or found unconscious. If possible, keep a mobile phone with you at all times, or wear an emergency alert device. You can contact emergency services by pressing a button on the device. Ask your healthcare provider for more information.    Arrange to have someone call your local emergency number (911 in the [place]) if:   •You have fallen and are found unconscious.      •You have fallen and cannot move part of your body.      Call your doctor if:   •You have fallen and have pain or a headache.      •You have questions or concerns about your condition or care.      CARE AGREEMENT:    You have the right to help plan your care. Learn about your health condition and how it may be treated. Discuss treatment options with your healthcare providers to decide what care you want to receive. You always have the right to refuse treatment.

## 2023-03-09 NOTE — ED PROVIDER NOTE - PATIENT PORTAL LINK FT
You can access the FollowMyHealth Patient Portal offered by [facility] by registering at the following website: [email or web address]. By joining [employer]’s FollowMyHealth portal, you will also be able to view your health information using other applications (apps) compatible with our system.

## 2023-03-09 NOTE — ED ADULT NURSE NOTE - CHIEF COMPLAINT QUOTE
sent from [facility], fell out of wheelchair, no complaints of pain, no obvious deformity noted

## 2023-03-09 NOTE — ED ADULT NURSE NOTE - OBJECTIVE STATEMENT
73 y/o female received awake alert via stretcher bibems from the [facility] for questionable fall eval. pt has hx advanced dementia and is unable to provide any info. no obvious signs of injury noted. pt's  at bedside

## 2023-03-09 NOTE — ED PROVIDER NOTE - OBJECTIVE STATEMENT
72-year-old female with past medical history of hypertension and dementia brought in by ambulance from the [facility] due to a fall.  EMS reports he was a witnessed fall out of the wheelchair.  Patient has baseline dementia and is a poor historian.  Patient without acute complaints.  Called the [place] in which they advised patient was in a wheelchair in which they were near her and she slid out of the chair and they heard a thump.  They are uncertain of head injury.  Denies vomiting, chest pain, shortness of breath, blood thinner use, or any other complaints.

## 2023-03-09 NOTE — ED ADULT TRIAGE NOTE - CHIEF COMPLAINT QUOTE
sent from [facility], fell out of wheelchair, no complaints of pain, no obvious deformity noted

## 2023-03-09 NOTE — ED ADULT NURSE NOTE - BREATH SOUNDS, MLM
Before your operation, you play an important role in decreasing your risk for infection by washing with special antiseptic soap  This is an effective way to reduce bacteria on the skin which may help to prevent infections at the surgical site  Please read the following directions in advance  1  In the week before your operation purchase a 4 ounce bottle of antiseptic soap containing chlorhexidine gluconate 4%  Some brand names include: Aplicare, Endure, and Hibiclens  The cost is usually less than $5 00  · For your convenience, the [address] [place] [address] carries the soap  · It may also be available at your doctor's office or pre-admission testing center, and at most retail pharmacies  · If you are allergic or sensitive to soaps containing chlorhexidine gluconate (CHG), please let your doctor know so another antiseptic soap can be suggested  · CHG antiseptic soap is for external use only  2      The day before your operation follow these directions carefully to get ready  · Place clean lines (sheets) on your bed; you should sleep on clean sheets after your evening shower  · Get clean towels and washcloths ready - you need enough for 2 showers  · Set aside clean underwear, pajamas, and clothing to wear after the shower  Reminders:  · DO NOT use any other soap or body rinse on your skin during or after the antiseptic showers  · DO NOT use lotion , powder, deodorant, or perfume/aftershave of any kind on your skin after your antiseptic shower  · DO NOT shave any body parts in the 24 hours/the day before your operation  · DO NOT get the antiseptic soap in your eyes, ears, nose, mouth, or vaginal area  3      You will need to shower the night before AND the morning of your Surgery  Shower 1:  · The evening before your operation, take the fist shower  · First, shampoo your hair with regular shampoo and rinse it completely before you use the anitseptic soap    After washing and rinsing your hair, rinse your body  · Next, use a clean wash cloth to apply the antiseptic soap and wash your body from the neck down to your toes using 1/2 bottle of the antiseptic soap  You will use the other 1/2 bottle for the second shower  · Clean the area where your incision will be; later this area well for about 2 minutes  · If you ar having head or neck surgery, wash areas with the antiseptic soap  · Rinse yourself completely with running water  · Use a clean towel to dry off  · Wear clean underwear and clothing/pajamas  Shower 2:  · The Morning of your operation, take the second shower following the same steps as Shower 1 using the second 1/2 of the bottle of antiseptic soap  · Use clean cloths and towels to was and dry yourself off  · Wear clean underwear and clothing  Clear

## 2023-05-23 NOTE — ASU PREOP CHECKLIST - 2.
42y  LMP 5/10 HTN, HLD, b/l PE on Eliquis (23), Abdominal Necrotizing fascitis (s/p suprapubic catheter and ostomy bag placement on 21 at [facility]), and PCOS found to have 3 cm simple right ovarian cyst on imaging today presenting with worsening lower abdominal pain.     #simple right ovarian cyst  -patient declined pelvic exam and pelvic ultrasound; however, based on the patient's clinical presentation and imaging, low likelihood for ovarian torsion  -cyst is likely physiologic as she is mid-cycle for her menses and has a history of PCOS  -no acute GYN intervention  -recommend outpatient follow up with GYN and follow up imaging in 6 months to assess resolution  -patient may see the following GYN attendings outpatient:                    Dr. Swetha Britt or Dr. Raquel Jeffery                    [address] [place] [address], [address]                    [place] [address]                    [phone]  -GYN signing off, please reconsult if additional questions arise    D/W Dr. Tran and Dr. Kelly, PGY4 Fall risk

## 2023-06-05 ENCOUNTER — APPOINTMENT (OUTPATIENT)
Dept: NEUROLOGY | Facility: CLINIC | Age: 73
End: 2023-06-05

## 2024-04-01 NOTE — ASSESSMENT
[FreeTextEntry1] : Assessment:\par 69yo RH WW, with ongoing LOAD dementia, now moderate-advanced. 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.\par Developing more apraxia, but overall stable.\par \par Diagnostic Impression:\par -LOAD\par \par Plan:\par -continue current Rx\par -recommend to continue PT and stay active.\par \par A thorough discussion was entertained with the patient/caregiver regarding the use of psychoactive medications, their possible benefits and AE profile, including the risk of cardiovascular complications, including but not limited to applicable black box warning and teratogenicity, where appropriate.\par We discussed the benefits of being active, physically and mentally, and the need to to establish a routine in this respect.\par Driving abilities and firearms possession and use were discussed, in relation to progression of the cognitive decline, and the need to assess them periodically.\par Patient/caregiver advised to bring previous records to this office.\par All questions were answered at the time of the visit. We are certainly available for further discussion as needed.\par Patient/caregiver fully understands and agree with the plan.\par  PROVIDER:[TOKEN:[[id number]],FOLLOWUP:[2 weeks]]

## 2024-12-20 NOTE — ED PROVIDER NOTE - NSFOLLOWUPINSTRUCTIONS_ED_ALL_ED_FT
Anastacio Rai  Orthopaedic Surgery  [address] [place] [id number]  Phone: [phone]  Fax: [phone]  Follow Up Time:   
Follow up with your PMD. If having worsening of symptoms or other related symptoms, RETURN TO THE ER IMMEDIATELY.     Head Injury, Adult       There are many types of head injuries. They can be as minor as a small bump. Some head injuries can be worse. Worse injuries include:  •A strong hit to the head that shakes the brain back and forth, causing damage (concussion).      •A bruise (contusion) of the brain. This means there is bleeding in the brain that can cause swelling.      •A cracked skull (skull fracture).      •Bleeding in the brain that gathers, gets thick (makes a clot), and forms a bump (hematoma).      Most problems from a head injury come in the first 24 hours. However, you may still have side effects up to 7–10 days after your injury. It is important to watch your condition for any changes. You may need to be watched in the emergency department or urgent care, or you may need to stay in the hospital.      What are the causes?    There are many possible causes of a head injury. A serious head injury may be caused by:  •A car accident.      •Bicycle or motorcycle accidents.      •Sports injuries.      •Falls.      •Being hit by an object.        What are the signs or symptoms?    Symptoms of a head injury include a bruise, bump, or bleeding where the injury happened. Other physical symptoms may include:  •Headache.      •Feeling like you may vomit (nauseous) or vomiting.      •Dizziness.      •Blurred or double vision.      •Being uncomfortable around bright lights or loud noises.      •Shaking movements that you cannot control (seizures).      •Feeling tired.      •Trouble being woken up.      •Fainting or loss of consciousness.      Mental or emotional symptoms may include:  •Feeling grumpy or cranky.      •Confusion and memory problems.      •Having trouble paying attention or concentrating.      •Changes in eating or sleeping habits.      •Feeling worried or nervous (anxious).      •Feeling sad (depressed).        How is this treated?    Treatment for this condition depends on how severe the injury is and the type of injury you have. The main goal is to prevent problems and to allow the brain time to heal.    Mild head injury     If you have a mild head injury, you may be sent home, and treatment may include:  •Being watched. A responsible adult should stay with you for 24 hours after your injury and check on you often.      •Physical rest.      •Brain rest.      •Pain medicines.      Severe head injury    If you have a severe head injury, treatment may include:  •Being watched closely. This includes staying in the hospital.    •Medicines to:  •Help with pain.      •Prevent seizures.      •Help with brain swelling.        •Protecting your airway and using a machine that helps you breathe (ventilator).      •Treatments to watch for and manage swelling inside the brain.    •Brain surgery. This may be needed to:  •Remove a collection of blood or blood clots.      •Stop the bleeding.      •Remove a part of the skull. This allows room for the brain to swell.          Follow these instructions at home:    Activity     •Rest.      •Avoid activities that are hard or tiring.      •Make sure you get enough sleep.    •Let your brain rest. Do this by limiting activities that need a lot of thought or attention, such as:  •Watching TV.      •Playing memory games and puzzles.      •Job-related work or homework.      •Working on the computer, social media, and texting.        •Avoid activities that could cause another head injury until your doctor says it is okay. This includes playing sports. Having another head injury, especially before the first one has healed, can be dangerous.      •Ask your doctor when it is safe for you to go back to your normal activities, such as work or school. Ask your doctor for a step-by-step plan for slowly going back to your normal activities.      •Ask your doctor when you can drive, ride a bicycle, or use heavy machinery. Do not do these activities if you are dizzy.        Lifestyle      • Do not drink alcohol until your doctor says it is okay.      • Do not use drugs.      •If it is harder than usual to remember things, write them down.      •If you are easily distracted, try to do one thing at a time.      •Talk with family members or close friends when making important decisions.      •Tell your friends, family, a trusted co-worker, and  about your injury, symptoms, and limits (restrictions). Have them watch for any problems that are new or getting worse.      General instructions     •Take over-the-counter and prescription medicines only as told by your doctor.      •Have someone stay with you for 24 hours after your head injury. This person should watch you for any changes in your symptoms and be ready to get help.      •Keep all follow-up visits as told by your doctor. This is important.      How is this prevented?     •Work on your balance and strength. This can help you avoid falls.      •Wear a seat belt when you are in a moving vehicle.    •Wear a helmet when you:  •Ride a bicycle.      •Ski.      •Do any other sport or activity that has a risk of injury.      •If you drink alcohol:•Limit how much you use to:  •0–1 drink a day for nonpregnant women.      •0–2 drinks a day for men.        •Be aware of how much alcohol is in your drink. In the [place] one drink equals one 12 oz bottle of beer (355 mL), one 5 oz glass of wine (148 mL), or one 1½ oz glass of hard liquor (44 mL).      •Make your home safer by:  •Getting rid of clutter from the floors and stairs. This includes things that can make you trip.      •Using grab bars in bathrooms and handrails by stairs.      •Placing non-slip mats on floors and in bathtubs.      •Putting more light in dim areas.          Where to find more information    •Centers for Disease Control and Prevention: www.cdc.gov        Get help right away if:  •You have:  •A very bad headache that is not helped by medicine.      •Trouble walking or weakness in your arms and legs.      •Clear or bloody fluid coming from your nose or ears.      •Changes in how you see (vision).      •A seizure.      •More confusion or more grumpy moods.        •Your symptoms get worse.      •You are sleepier than normal and have trouble staying awake.      •You lose your balance.      •The black centers of your eyes (pupils) change in size.      •Your speech is slurred.      •Your dizziness gets worse.      •You vomit.      These symptoms may be an emergency. Do not wait to see if the symptoms will go away. Get medical help right away. Call your local emergency services (911 in the [place] Do not drive yourself to the hospital.       Summary    •Head injuries can be as minor as a small bump. Some head injuries can be worse.      •Treatment for this condition depends on how severe the injury is and the type of injury you have.      •Have someone stay with you for 24 hours after your head injury.      •Ask your doctor when it is safe for you to go back to your normal activities, such as work or school.      •To prevent a head injury, wear a seat belt in a car, wear a helmet when you use a bicycle, limit your alcohol use, and make your home safer.      This information is not intended to replace advice given to you by your health care provider. Make sure you discuss any questions you have with your health care provider.

## 2025-06-04 NOTE — ED ADULT NURSE NOTE - NSFALLRSKASSISTTYPE_ED_ALL_ED
SOC completed on follow up call. Enrico reports that he has 2 wounds on his right leg and VNA nurse comes once a week otherwise his daughters change the dressing. He does try and elevate his legs when he sits but also tries to walk with a walker around the house.Pt is awaiting a piece for his circaid before he starts to use that.   He eats healthier options and avoids processed foods. Todays weight was 177 lbs, /69.He takes his meds out of each bottle daily, in AM and in PM without sing a pill box. Pt denies any new concerns other than his leg wounds and swelling. He also had complaint  of aches in his hips, knees and lower back, but sleeps in a bed with a small pillow and has no problems related to sleeping or pain.  Pt's daughter was also on speaker call. He denies anything of which he would like to discuss at this time but would welcome follow up in 2-3 weeks. Will place reminder for then.   
Standing/Walking/Toileting